# Patient Record
Sex: FEMALE | Race: WHITE | NOT HISPANIC OR LATINO | Employment: OTHER | URBAN - METROPOLITAN AREA
[De-identification: names, ages, dates, MRNs, and addresses within clinical notes are randomized per-mention and may not be internally consistent; named-entity substitution may affect disease eponyms.]

---

## 2015-05-13 LAB — CRC RECOMMENDATION EXT: NORMAL

## 2018-03-20 LAB
INFLUENZA A ANTIGEN, POC: NEGATIVE
INFLUENZA B ANTIGEN, POC: NEGATIVE
RAPID GROUP A STREP (OHS): NEGATIVE

## 2018-08-14 ENCOUNTER — HISTORICAL (OUTPATIENT)
Dept: LAB | Facility: HOSPITAL | Age: 63
End: 2018-08-14

## 2018-08-14 LAB
ABS NEUT (OLG): 6.4 X10(3)/MCL (ref 2.1–9.2)
ALBUMIN SERPL-MCNC: 4.2 GM/DL (ref 3.4–5)
ALBUMIN/GLOB SERPL: 1.2 RATIO (ref 1.1–2)
ALP SERPL-CCNC: 61 UNIT/L (ref 38–126)
ALT SERPL-CCNC: 26 UNIT/L (ref 12–78)
APPEARANCE, UA: CLEAR
AST SERPL-CCNC: 17 UNIT/L (ref 15–37)
BACTERIA SPEC CULT: NORMAL /HPF
BASOPHILS # BLD AUTO: 0.1 X10(3)/MCL (ref 0–0.2)
BASOPHILS NFR BLD AUTO: 1 %
BILIRUB SERPL-MCNC: 0.7 MG/DL (ref 0.2–1)
BILIRUB UR QL STRIP: NEGATIVE
BILIRUBIN DIRECT+TOT PNL SERPL-MCNC: 0.1 MG/DL (ref 0–0.5)
BILIRUBIN DIRECT+TOT PNL SERPL-MCNC: 0.6 MG/DL (ref 0–0.8)
BUN SERPL-MCNC: 11 MG/DL (ref 7–18)
CALCIUM SERPL-MCNC: 9 MG/DL (ref 8.5–10.1)
CHLORIDE SERPL-SCNC: 108 MMOL/L (ref 98–107)
CHOLEST SERPL-MCNC: 165 MG/DL (ref 0–200)
CHOLEST/HDLC SERPL: 3.8 {RATIO} (ref 0–4)
CO2 SERPL-SCNC: 23 MMOL/L (ref 21–32)
COLOR UR: YELLOW
CREAT SERPL-MCNC: 0.68 MG/DL (ref 0.55–1.02)
EOSINOPHIL # BLD AUTO: 0.2 X10(3)/MCL (ref 0–0.9)
EOSINOPHIL NFR BLD AUTO: 2 %
ERYTHROCYTE [DISTWIDTH] IN BLOOD BY AUTOMATED COUNT: 13.2 % (ref 11.5–17)
EST. AVERAGE GLUCOSE BLD GHB EST-MCNC: 108 MG/DL
GLOBULIN SER-MCNC: 3.4 GM/DL (ref 2.4–3.5)
GLUCOSE (UA): NEGATIVE
GLUCOSE SERPL-MCNC: 84 MG/DL (ref 74–106)
HBA1C MFR BLD: 5.4 % (ref 4.2–6.3)
HCT VFR BLD AUTO: 43.5 % (ref 37–47)
HDLC SERPL-MCNC: 44 MG/DL (ref 35–60)
HGB BLD-MCNC: 14.2 GM/DL (ref 12–16)
HGB UR QL STRIP: NEGATIVE
KETONES UR QL STRIP: NEGATIVE
LDLC SERPL CALC-MCNC: 86 MG/DL (ref 0–129)
LEUKOCYTE ESTERASE UR QL STRIP: NEGATIVE
LYMPHOCYTES # BLD AUTO: 2.8 X10(3)/MCL (ref 0.6–4.6)
LYMPHOCYTES NFR BLD AUTO: 28 %
MCH RBC QN AUTO: 30.1 PG (ref 27–31)
MCHC RBC AUTO-ENTMCNC: 32.6 GM/DL (ref 33–36)
MCV RBC AUTO: 92.4 FL (ref 80–94)
MONOCYTES # BLD AUTO: 0.6 X10(3)/MCL (ref 0.1–1.3)
MONOCYTES NFR BLD AUTO: 6 %
NEUTROPHILS # BLD AUTO: 6.4 X10(3)/MCL (ref 1.4–7.9)
NEUTROPHILS NFR BLD AUTO: 63 %
NITRITE UR QL STRIP: NEGATIVE
PH UR STRIP: 6 [PH] (ref 5–9)
PLATELET # BLD AUTO: 228 X10(3)/MCL (ref 130–400)
PMV BLD AUTO: 10.5 FL (ref 9.4–12.4)
POTASSIUM SERPL-SCNC: 4.6 MMOL/L (ref 3.5–5.1)
PROT SERPL-MCNC: 7.6 GM/DL (ref 6.4–8.2)
PROT UR QL STRIP: NEGATIVE
RBC # BLD AUTO: 4.71 X10(6)/MCL (ref 4.2–5.4)
RBC #/AREA URNS HPF: NORMAL /[HPF]
SODIUM SERPL-SCNC: 139 MMOL/L (ref 136–145)
SP GR UR STRIP: 1 (ref 1–1.03)
SQUAMOUS EPITHELIAL, UA: NORMAL
TRIGL SERPL-MCNC: 174 MG/DL (ref 30–150)
UA WBC MAN: NORMAL
UROBILINOGEN UR STRIP-ACNC: 0.2
VLDLC SERPL CALC-MCNC: 35 MG/DL
WBC # SPEC AUTO: 10.2 X10(3)/MCL (ref 4.5–11.5)

## 2019-02-14 LAB
CRC RECOMMENDATION EXT: NORMAL
FLEX SIG FOLLOW UP EXTERNAL: NORMAL

## 2019-02-18 ENCOUNTER — HISTORICAL (OUTPATIENT)
Dept: LAB | Facility: HOSPITAL | Age: 64
End: 2019-02-18

## 2019-02-18 LAB
ABS NEUT (OLG): 5.14 X10(3)/MCL (ref 2.1–9.2)
ALBUMIN SERPL-MCNC: 4.6 GM/DL (ref 3.4–5)
ALBUMIN/GLOB SERPL: 1.4 RATIO (ref 1.1–2)
ALP SERPL-CCNC: 60 UNIT/L (ref 38–126)
ALT SERPL-CCNC: 26 UNIT/L (ref 12–78)
APPEARANCE, UA: ABNORMAL
AST SERPL-CCNC: 11 UNIT/L (ref 15–37)
BACTERIA SPEC CULT: ABNORMAL /HPF
BASOPHILS # BLD AUTO: 0.1 X10(3)/MCL (ref 0–0.2)
BASOPHILS NFR BLD AUTO: 1 %
BILIRUB SERPL-MCNC: 0.5 MG/DL (ref 0.2–1)
BILIRUB UR QL STRIP: NEGATIVE
BILIRUBIN DIRECT+TOT PNL SERPL-MCNC: 0.1 MG/DL (ref 0–0.5)
BILIRUBIN DIRECT+TOT PNL SERPL-MCNC: 0.4 MG/DL (ref 0–0.8)
BUN SERPL-MCNC: 11 MG/DL (ref 7–18)
CALCIUM SERPL-MCNC: 9.9 MG/DL (ref 8.5–10.1)
CHLORIDE SERPL-SCNC: 109 MMOL/L (ref 98–107)
CO2 SERPL-SCNC: 25 MMOL/L (ref 21–32)
COLOR UR: YELLOW
CREAT SERPL-MCNC: 0.82 MG/DL (ref 0.55–1.02)
EOSINOPHIL # BLD AUTO: 0.3 X10(3)/MCL (ref 0–0.9)
EOSINOPHIL NFR BLD AUTO: 4 %
ERYTHROCYTE [DISTWIDTH] IN BLOOD BY AUTOMATED COUNT: 12.5 % (ref 11.5–17)
GLOBULIN SER-MCNC: 3.4 GM/DL (ref 2.4–3.5)
GLUCOSE (UA): NEGATIVE
GLUCOSE SERPL-MCNC: 97 MG/DL (ref 74–106)
HCT VFR BLD AUTO: 45.2 % (ref 37–47)
HGB BLD-MCNC: 14.7 GM/DL (ref 12–16)
HGB UR QL STRIP: NEGATIVE
KETONES UR QL STRIP: NEGATIVE
LEUKOCYTE ESTERASE UR QL STRIP: NEGATIVE
LYMPHOCYTES # BLD AUTO: 2.6 X10(3)/MCL (ref 0.6–4.6)
LYMPHOCYTES NFR BLD AUTO: 29 %
MCH RBC QN AUTO: 29.9 PG (ref 27–31)
MCHC RBC AUTO-ENTMCNC: 32.5 GM/DL (ref 33–36)
MCV RBC AUTO: 92.1 FL (ref 80–94)
MONOCYTES # BLD AUTO: 0.6 X10(3)/MCL (ref 0.1–1.3)
MONOCYTES NFR BLD AUTO: 7 %
NEUTROPHILS # BLD AUTO: 5.14 X10(3)/MCL (ref 2.1–9.2)
NEUTROPHILS NFR BLD AUTO: 59 %
NITRITE UR QL STRIP: NEGATIVE
PH UR STRIP: 5 [PH] (ref 5–9)
PLATELET # BLD AUTO: 259 X10(3)/MCL (ref 130–400)
PMV BLD AUTO: 10.4 FL (ref 9.4–12.4)
POTASSIUM SERPL-SCNC: 4.8 MMOL/L (ref 3.5–5.1)
PROT SERPL-MCNC: 8 GM/DL (ref 6.4–8.2)
PROT UR QL STRIP: NEGATIVE
RBC # BLD AUTO: 4.91 X10(6)/MCL (ref 4.2–5.4)
RBC #/AREA URNS HPF: ABNORMAL /[HPF]
SODIUM SERPL-SCNC: 140 MMOL/L (ref 136–145)
SP GR UR STRIP: 1.02 (ref 1–1.03)
SQUAMOUS EPITHELIAL, UA: 6 /HPF (ref 0–4)
TSH SERPL-ACNC: 10.3 MIU/L (ref 0.36–3.74)
UROBILINOGEN UR STRIP-ACNC: 0.2
WBC # SPEC AUTO: 8.7 X10(3)/MCL (ref 4.5–11.5)
WBC #/AREA URNS HPF: 6 /HPF (ref 0–3)

## 2019-02-21 LAB — FINAL CULTURE: NO GROWTH

## 2019-02-26 ENCOUNTER — HISTORICAL (OUTPATIENT)
Dept: LAB | Facility: HOSPITAL | Age: 64
End: 2019-02-26

## 2019-02-26 LAB
T4 FREE SERPL-MCNC: 0.86 NG/DL (ref 0.76–1.46)
TSH SERPL-ACNC: 6.43 MIU/L (ref 0.36–3.74)

## 2019-03-19 ENCOUNTER — HISTORICAL (OUTPATIENT)
Dept: LAB | Facility: HOSPITAL | Age: 64
End: 2019-03-19

## 2019-03-19 LAB — TSH SERPL-ACNC: 6.45 MIU/L (ref 0.36–3.74)

## 2019-04-24 ENCOUNTER — HISTORICAL (OUTPATIENT)
Dept: LAB | Facility: HOSPITAL | Age: 64
End: 2019-04-24

## 2019-04-24 LAB — TSH SERPL-ACNC: 2.88 MIU/L (ref 0.36–3.74)

## 2019-09-10 ENCOUNTER — HISTORICAL (OUTPATIENT)
Dept: LAB | Facility: HOSPITAL | Age: 64
End: 2019-09-10

## 2019-09-10 LAB
ABS NEUT (OLG): 4.67 X10(3)/MCL (ref 2.1–9.2)
ALBUMIN SERPL-MCNC: 4.3 GM/DL (ref 3.4–5)
ALBUMIN/GLOB SERPL: 1.3 RATIO (ref 1.1–2)
ALP SERPL-CCNC: 56 UNIT/L (ref 38–126)
ALT SERPL-CCNC: 26 UNIT/L (ref 12–78)
AST SERPL-CCNC: 12 UNIT/L (ref 15–37)
BASOPHILS # BLD AUTO: 0.1 X10(3)/MCL (ref 0–0.2)
BASOPHILS NFR BLD AUTO: 1 %
BILIRUB SERPL-MCNC: 0.6 MG/DL (ref 0.2–1)
BILIRUBIN DIRECT+TOT PNL SERPL-MCNC: 0.1 MG/DL (ref 0–0.5)
BILIRUBIN DIRECT+TOT PNL SERPL-MCNC: 0.5 MG/DL (ref 0–0.8)
BUN SERPL-MCNC: 9 MG/DL (ref 7–18)
CALCIUM SERPL-MCNC: 9.8 MG/DL (ref 8.5–10.1)
CHLORIDE SERPL-SCNC: 108 MMOL/L (ref 98–107)
CHOLEST SERPL-MCNC: 198 MG/DL (ref 0–200)
CHOLEST/HDLC SERPL: 4.4 {RATIO} (ref 0–4)
CO2 SERPL-SCNC: 26 MMOL/L (ref 21–32)
CREAT SERPL-MCNC: 0.88 MG/DL (ref 0.55–1.02)
EOSINOPHIL # BLD AUTO: 0.3 X10(3)/MCL (ref 0–0.9)
EOSINOPHIL NFR BLD AUTO: 4 %
ERYTHROCYTE [DISTWIDTH] IN BLOOD BY AUTOMATED COUNT: 12.7 % (ref 11.5–17)
EST. AVERAGE GLUCOSE BLD GHB EST-MCNC: 111 MG/DL
GLOBULIN SER-MCNC: 3.3 GM/DL (ref 2.4–3.5)
GLUCOSE SERPL-MCNC: 99 MG/DL (ref 74–106)
HBA1C MFR BLD: 5.5 % (ref 4.2–6.3)
HCT VFR BLD AUTO: 44.3 % (ref 37–47)
HDLC SERPL-MCNC: 45 MG/DL (ref 35–60)
HGB BLD-MCNC: 13.8 GM/DL (ref 12–16)
LDLC SERPL CALC-MCNC: 104 MG/DL (ref 0–129)
LYMPHOCYTES # BLD AUTO: 2.2 X10(3)/MCL (ref 0.6–4.6)
LYMPHOCYTES NFR BLD AUTO: 28 %
MCH RBC QN AUTO: 29.4 PG (ref 27–31)
MCHC RBC AUTO-ENTMCNC: 31.2 GM/DL (ref 33–36)
MCV RBC AUTO: 94.5 FL (ref 80–94)
MONOCYTES # BLD AUTO: 0.5 X10(3)/MCL (ref 0.1–1.3)
MONOCYTES NFR BLD AUTO: 7 %
NEUTROPHILS # BLD AUTO: 4.67 X10(3)/MCL (ref 2.1–9.2)
NEUTROPHILS NFR BLD AUTO: 59 %
PLATELET # BLD AUTO: 259 X10(3)/MCL (ref 130–400)
PMV BLD AUTO: 10.1 FL (ref 9.4–12.4)
POTASSIUM SERPL-SCNC: 5.3 MMOL/L (ref 3.5–5.1)
PROT SERPL-MCNC: 7.6 GM/DL (ref 6.4–8.2)
RBC # BLD AUTO: 4.69 X10(6)/MCL (ref 4.2–5.4)
SODIUM SERPL-SCNC: 147 MMOL/L (ref 136–145)
TRIGL SERPL-MCNC: 243 MG/DL (ref 30–150)
TSH SERPL-ACNC: 2.09 MIU/L (ref 0.36–3.74)
VLDLC SERPL CALC-MCNC: 49 MG/DL
WBC # SPEC AUTO: 7.9 X10(3)/MCL (ref 4.5–11.5)

## 2019-10-02 ENCOUNTER — HISTORICAL (OUTPATIENT)
Dept: RADIOLOGY | Facility: HOSPITAL | Age: 64
End: 2019-10-02

## 2020-09-22 ENCOUNTER — HISTORICAL (OUTPATIENT)
Dept: ADMINISTRATIVE | Facility: HOSPITAL | Age: 65
End: 2020-09-22

## 2020-09-22 LAB
ABS NEUT (OLG): 4.42 X10(3)/MCL (ref 2.1–9.2)
ALBUMIN SERPL-MCNC: 4.2 GM/DL (ref 3.4–4.8)
ALBUMIN/GLOB SERPL: 1.4 RATIO (ref 1.1–2)
ALP SERPL-CCNC: 54 UNIT/L (ref 40–150)
ALT SERPL-CCNC: 26 UNIT/L (ref 0–55)
AST SERPL-CCNC: 21 UNIT/L (ref 5–34)
BASOPHILS # BLD AUTO: 0.1 X10(3)/MCL (ref 0–0.2)
BASOPHILS NFR BLD AUTO: 1 %
BILIRUB SERPL-MCNC: 0.6 MG/DL
BILIRUBIN DIRECT+TOT PNL SERPL-MCNC: 0.2 MG/DL (ref 0–0.5)
BILIRUBIN DIRECT+TOT PNL SERPL-MCNC: 0.4 MG/DL (ref 0–0.8)
BUN SERPL-MCNC: 7.3 MG/DL (ref 9.8–20.1)
CALCIUM SERPL-MCNC: 9 MG/DL (ref 8.4–10.2)
CHLORIDE SERPL-SCNC: 107 MMOL/L (ref 98–107)
CHOLEST SERPL-MCNC: 190 MG/DL
CHOLEST/HDLC SERPL: 5 {RATIO} (ref 0–5)
CO2 SERPL-SCNC: 27 MMOL/L (ref 23–31)
CREAT SERPL-MCNC: 0.79 MG/DL (ref 0.55–1.02)
EOSINOPHIL # BLD AUTO: 0.3 X10(3)/MCL (ref 0–0.9)
EOSINOPHIL NFR BLD AUTO: 4 %
ERYTHROCYTE [DISTWIDTH] IN BLOOD BY AUTOMATED COUNT: 12.9 % (ref 11.5–17)
EST. AVERAGE GLUCOSE BLD GHB EST-MCNC: 105.4 MG/DL
GLOBULIN SER-MCNC: 2.9 GM/DL (ref 2.4–3.5)
GLUCOSE SERPL-MCNC: 90 MG/DL (ref 82–115)
HBA1C MFR BLD: 5.3 %
HCT VFR BLD AUTO: 41.1 % (ref 37–47)
HDLC SERPL-MCNC: 42 MG/DL (ref 35–60)
HGB BLD-MCNC: 13.2 GM/DL (ref 12–16)
LDLC SERPL CALC-MCNC: 98 MG/DL (ref 50–140)
LYMPHOCYTES # BLD AUTO: 1.8 X10(3)/MCL (ref 0.6–4.6)
LYMPHOCYTES NFR BLD AUTO: 25 %
MCH RBC QN AUTO: 29.9 PG (ref 27–31)
MCHC RBC AUTO-ENTMCNC: 32.1 GM/DL (ref 33–36)
MCV RBC AUTO: 93.2 FL (ref 80–94)
MONOCYTES # BLD AUTO: 0.5 X10(3)/MCL (ref 0.1–1.3)
MONOCYTES NFR BLD AUTO: 7 %
NEUTROPHILS # BLD AUTO: 4.42 X10(3)/MCL (ref 2.1–9.2)
NEUTROPHILS NFR BLD AUTO: 61 %
PLATELET # BLD AUTO: 221 X10(3)/MCL (ref 130–400)
PMV BLD AUTO: 10.5 FL (ref 9.4–12.4)
POTASSIUM SERPL-SCNC: 4.8 MMOL/L (ref 3.5–5.1)
PROT SERPL-MCNC: 7.1 GM/DL (ref 5.8–7.6)
RBC # BLD AUTO: 4.41 X10(6)/MCL (ref 4.2–5.4)
SODIUM SERPL-SCNC: 140 MMOL/L (ref 136–145)
TRIGL SERPL-MCNC: 249 MG/DL (ref 37–140)
TSH SERPL-ACNC: 2.13 UIU/ML (ref 0.35–4.94)
VLDLC SERPL CALC-MCNC: 50 MG/DL
WBC # SPEC AUTO: 7.2 X10(3)/MCL (ref 4.5–11.5)

## 2021-09-28 ENCOUNTER — HISTORICAL (OUTPATIENT)
Dept: ADMINISTRATIVE | Facility: HOSPITAL | Age: 66
End: 2021-09-28

## 2021-09-28 LAB
ABS NEUT (OLG): 4.43 X10(3)/MCL (ref 2.1–9.2)
ALBUMIN SERPL-MCNC: 4.3 GM/DL (ref 3.4–4.8)
ALBUMIN/GLOB SERPL: 1.5 RATIO (ref 1.1–2)
ALP SERPL-CCNC: 59 UNIT/L (ref 40–150)
ALT SERPL-CCNC: 23 UNIT/L (ref 0–55)
AST SERPL-CCNC: 18 UNIT/L (ref 5–34)
BASOPHILS # BLD AUTO: 0.1 X10(3)/MCL (ref 0–0.2)
BASOPHILS NFR BLD AUTO: 1 %
BILIRUB SERPL-MCNC: 0.5 MG/DL
BILIRUBIN DIRECT+TOT PNL SERPL-MCNC: 0.2 MG/DL (ref 0–0.5)
BILIRUBIN DIRECT+TOT PNL SERPL-MCNC: 0.3 MG/DL (ref 0–0.8)
BUN SERPL-MCNC: 9.4 MG/DL (ref 9.8–20.1)
CALCIUM SERPL-MCNC: 10.1 MG/DL (ref 8.4–10.2)
CHLORIDE SERPL-SCNC: 110 MMOL/L (ref 98–107)
CHOLEST SERPL-MCNC: 170 MG/DL
CHOLEST/HDLC SERPL: 5 {RATIO} (ref 0–5)
CO2 SERPL-SCNC: 25 MMOL/L (ref 23–31)
CREAT SERPL-MCNC: 0.81 MG/DL (ref 0.55–1.02)
EOSINOPHIL # BLD AUTO: 0.2 X10(3)/MCL (ref 0–0.9)
EOSINOPHIL NFR BLD AUTO: 3 %
ERYTHROCYTE [DISTWIDTH] IN BLOOD BY AUTOMATED COUNT: 13.5 % (ref 11.5–17)
EST. AVERAGE GLUCOSE BLD GHB EST-MCNC: 99.7 MG/DL
GLOBULIN SER-MCNC: 2.9 GM/DL (ref 2.4–3.5)
GLUCOSE SERPL-MCNC: 91 MG/DL (ref 82–115)
HBA1C MFR BLD: 5.1 %
HCT VFR BLD AUTO: 40 % (ref 37–47)
HDLC SERPL-MCNC: 37 MG/DL (ref 35–60)
HGB BLD-MCNC: 12.6 GM/DL (ref 12–16)
LDLC SERPL CALC-MCNC: 72 MG/DL (ref 50–140)
LYMPHOCYTES # BLD AUTO: 1.9 X10(3)/MCL (ref 0.6–4.6)
LYMPHOCYTES NFR BLD AUTO: 26 %
MCH RBC QN AUTO: 29.5 PG (ref 27–31)
MCHC RBC AUTO-ENTMCNC: 31.5 GM/DL (ref 33–36)
MCV RBC AUTO: 93.7 FL (ref 80–94)
MONOCYTES # BLD AUTO: 0.5 X10(3)/MCL (ref 0.1–1.3)
MONOCYTES NFR BLD AUTO: 7 %
NEUTROPHILS # BLD AUTO: 4.43 X10(3)/MCL (ref 2.1–9.2)
NEUTROPHILS NFR BLD AUTO: 62 %
PLATELET # BLD AUTO: 268 X10(3)/MCL (ref 130–400)
PMV BLD AUTO: 10.7 FL (ref 9.4–12.4)
POTASSIUM SERPL-SCNC: 5 MMOL/L (ref 3.5–5.1)
PROT SERPL-MCNC: 7.2 GM/DL (ref 5.8–7.6)
RBC # BLD AUTO: 4.27 X10(6)/MCL (ref 4.2–5.4)
SODIUM SERPL-SCNC: 145 MMOL/L (ref 136–145)
TRIGL SERPL-MCNC: 307 MG/DL (ref 37–140)
TSH SERPL-ACNC: 2.34 UIU/ML (ref 0.35–4.94)
VLDLC SERPL CALC-MCNC: 61 MG/DL
WBC # SPEC AUTO: 7.2 X10(3)/MCL (ref 4.5–11.5)

## 2022-05-03 NOTE — HISTORICAL [facility]ER
This is a historical note converted from [employer]. Formatting and pictures may have been removed.  Please reference [employer] for original formatting and attached multimedia. Spoke to patient over the phone, she discontinued all of her blood pressure medications and?blood pressure readings at home?are normal?continuously. ?Therefore,?she will be taken off of her blood pressure medication?and?her visit will be changed from 6 months for reevaluation to 1 year for wellness

## 2022-07-31 ENCOUNTER — OFFICE VISIT (OUTPATIENT)
Dept: URGENT CARE | Facility: CLINIC | Age: 67
End: 2022-07-31
Payer: MEDICARE

## 2022-07-31 VITALS
BODY MASS INDEX: 25.41 KG/M2 | HEART RATE: 73 BPM | RESPIRATION RATE: 18 BRPM | TEMPERATURE: 98 F | DIASTOLIC BLOOD PRESSURE: 83 MMHG | HEIGHT: 63 IN | WEIGHT: 143.38 LBS | SYSTOLIC BLOOD PRESSURE: 189 MMHG | OXYGEN SATURATION: 98 %

## 2022-07-31 DIAGNOSIS — H60.331 ACUTE SWIMMER'S EAR OF RIGHT SIDE: ICD-10-CM

## 2022-07-31 PROBLEM — F41.1 GENERALIZED ANXIETY DISORDER: Status: ACTIVE | Noted: 2022-07-31

## 2022-07-31 PROBLEM — C18.9 MALIGNANT NEOPLASM OF COLON: Status: ACTIVE | Noted: 2022-07-31

## 2022-07-31 PROBLEM — E78.1 HYPERTRIGLYCERIDEMIA: Status: ACTIVE | Noted: 2022-07-31

## 2022-07-31 PROBLEM — E03.9 HYPOTHYROIDISM: Status: ACTIVE | Noted: 2022-07-31

## 2022-07-31 PROBLEM — Z85.42 HISTORY OF ENDOMETRIAL CANCER: Status: ACTIVE | Noted: 2022-07-31

## 2022-07-31 PROBLEM — C44.90 MALIGNANT NEOPLASM OF SKIN: Status: ACTIVE | Noted: 2022-07-31

## 2022-07-31 PROCEDURE — 99213 PR OFFICE/OUTPT VISIT, EST, LEVL III, 20-29 MIN: ICD-10-PCS | Mod: ,,, | Performed by: GENERAL PRACTICE

## 2022-07-31 PROCEDURE — 99213 OFFICE O/P EST LOW 20 MIN: CPT | Mod: ,,, | Performed by: GENERAL PRACTICE

## 2022-07-31 RX ORDER — LORAZEPAM 0.5 MG/1
TABLET ORAL
COMMUNITY
Start: 2021-11-17 | End: 2022-10-03 | Stop reason: SDUPTHER

## 2022-07-31 RX ORDER — CIPROFLOXACIN AND DEXAMETHASONE 3; 1 MG/ML; MG/ML
4 SUSPENSION/ DROPS AURICULAR (OTIC) 2 TIMES DAILY
Qty: 7.5 ML | Refills: 1 | Status: SHIPPED | OUTPATIENT
Start: 2022-07-31 | End: 2022-07-31

## 2022-07-31 RX ORDER — LEVOTHYROXINE SODIUM 50 UG/1
50 TABLET ORAL DAILY
COMMUNITY
Start: 2022-07-04 | End: 2022-10-03 | Stop reason: SDUPTHER

## 2022-07-31 RX ORDER — NEOMYCIN SULFATE, POLYMYXIN B SULFATE AND HYDROCORTISONE 10; 3.5; 1 MG/ML; MG/ML; [USP'U]/ML
3 SUSPENSION/ DROPS AURICULAR (OTIC) 4 TIMES DAILY
Qty: 10 ML | Refills: 2 | Status: SHIPPED | OUTPATIENT
Start: 2022-07-31 | End: 2022-08-07

## 2022-07-31 NOTE — PROGRESS NOTES
"Subjective:       Patient ID: Cesia Lawson is a 66 y.o. female.    Vitals:  height is 5' 3" (1.6 m) and weight is 65 kg (143 lb 6.4 oz). Her oral temperature is 98.3 °F (36.8 °C). Her blood pressure is 189/83 (abnormal) and her pulse is 73. Her respiration is 18 and oxygen saturation is 98%.     Chief Complaint: Ear Fullness (Francisco J ear fullness, itching x yesterday) and Otalgia (Right ear pain x yesterday)    66 y.o. female presents to clinic c/o francisco j ear fullness, itching, right ear pain x yesterday. Pt has taken ibuprofen for symptoms.     Ear Fullness   There is pain in both ears. This is a new problem. The current episode started yesterday. The problem occurs constantly. The problem has been unchanged. There has been no fever. The pain is mild. Pertinent negatives include no coughing or sore throat. She has tried NSAIDs for the symptoms. The treatment provided mild relief.   Otalgia   There is pain in the right ear. This is a new problem. The current episode started yesterday. The problem occurs constantly. The problem has been unchanged. There has been no fever. The pain is mild. Pertinent negatives include no coughing or sore throat. She has tried NSAIDs for the symptoms. The treatment provided mild relief.       Constitution: Negative for fever and generalized weakness.   HENT: Positive for ear pain. Negative for sore throat.    Cardiovascular: Negative.    Respiratory: Negative for cough.    Gastrointestinal: Negative.        Objective:      Physical Exam   HENT:   Ears:   Right Ear: Tympanic membrane normal.   Left Ear: Tympanic membrane normal.   Mouth/Throat: No oropharyngeal exudate or posterior oropharyngeal erythema.   Eyes: Conjunctivae are normal.   Neck: Neck supple.   Cardiovascular: Normal rate and regular rhythm.   Pulmonary/Chest: Effort normal and breath sounds normal.   Abdominal: Normal appearance.   Skin: Skin is warm, dry and no rash.         Assessment:       1. Acute swimmer's ear of " right side          Plan:         Acute swimmer's ear of right side  -     Discontinue: ciprofloxacin-dexamethasone 0.3-0.1% (CIPRODEX) 0.3-0.1 % DrpS; Place 4 drops into both ears 2 (two) times daily.  Dispense: 7.5 mL; Refill: 1  -     neomycin-polymyxin-hydrocortisone (CORTISPORIN) 3.5-10,000-1 mg/mL-unit/mL-% otic suspension; Place 3 drops into the right ear 4 (four) times daily. for 7 days  Dispense: 10 mL; Refill: 2    Start ear drops today, use as directed.  Ibuprofen for pain as needed.  Do not submerge head under water for several days, or until symptoms improve.  Seek medical re-evaluation if symptoms do not improve with this treatment plan over the next several days.

## 2022-07-31 NOTE — ASSESSMENT & PLAN NOTE
Start ear drops today, use as directed.  Ibuprofen for pain as needed.  Do not submerge head under water for several days, or until symptoms improve.  Seek medical re-evaluation if symptoms do not improve with this treatment plan over the next several days.

## 2022-09-17 ENCOUNTER — HISTORICAL (OUTPATIENT)
Dept: ADMINISTRATIVE | Facility: HOSPITAL | Age: 67
End: 2022-09-17
Payer: COMMERCIAL

## 2022-09-19 ENCOUNTER — PATIENT MESSAGE (OUTPATIENT)
Dept: PRIMARY CARE CLINIC | Facility: CLINIC | Age: 67
End: 2022-09-19
Payer: COMMERCIAL

## 2022-10-03 ENCOUNTER — PATIENT MESSAGE (OUTPATIENT)
Dept: PRIMARY CARE CLINIC | Facility: CLINIC | Age: 67
End: 2022-10-03
Payer: COMMERCIAL

## 2022-10-03 RX ORDER — LORAZEPAM 0.5 MG/1
0.5 TABLET ORAL EVERY 8 HOURS PRN
Qty: 90 TABLET | Refills: 1 | Status: SHIPPED | OUTPATIENT
Start: 2022-10-03 | End: 2023-07-19 | Stop reason: SDUPTHER

## 2022-10-03 RX ORDER — LEVOTHYROXINE SODIUM 50 UG/1
50 TABLET ORAL DAILY
Qty: 90 TABLET | Refills: 3 | Status: SHIPPED | OUTPATIENT
Start: 2022-10-03 | End: 2023-10-18 | Stop reason: SDUPTHER

## 2022-12-01 DIAGNOSIS — Z00.00 ENCOUNTER FOR WELLNESS EXAMINATION: Primary | ICD-10-CM

## 2022-12-01 DIAGNOSIS — Z13.220 ENCOUNTER FOR LIPID SCREENING FOR CARDIOVASCULAR DISEASE: ICD-10-CM

## 2022-12-01 DIAGNOSIS — Z13.6 ENCOUNTER FOR LIPID SCREENING FOR CARDIOVASCULAR DISEASE: ICD-10-CM

## 2022-12-02 ENCOUNTER — CLINICAL SUPPORT (OUTPATIENT)
Dept: PRIMARY CARE CLINIC | Facility: CLINIC | Age: 67
End: 2022-12-02
Payer: MEDICARE

## 2022-12-02 DIAGNOSIS — Z13.220 ENCOUNTER FOR LIPID SCREENING FOR CARDIOVASCULAR DISEASE: ICD-10-CM

## 2022-12-02 DIAGNOSIS — Z13.6 ENCOUNTER FOR LIPID SCREENING FOR CARDIOVASCULAR DISEASE: ICD-10-CM

## 2022-12-02 DIAGNOSIS — Z00.00 ENCOUNTER FOR WELLNESS EXAMINATION: ICD-10-CM

## 2022-12-02 LAB
ALBUMIN SERPL-MCNC: 4.2 GM/DL (ref 3.4–4.8)
ALBUMIN/GLOB SERPL: 1.6 RATIO (ref 1.1–2)
ALP SERPL-CCNC: 54 UNIT/L (ref 40–150)
ALT SERPL-CCNC: 14 UNIT/L (ref 0–55)
AST SERPL-CCNC: 14 UNIT/L (ref 5–34)
BASOPHILS # BLD AUTO: 0.09 X10(3)/MCL (ref 0–0.2)
BASOPHILS NFR BLD AUTO: 1.3 %
BILIRUBIN DIRECT+TOT PNL SERPL-MCNC: 0.7 MG/DL
BUN SERPL-MCNC: 11.7 MG/DL (ref 9.8–20.1)
CALCIUM SERPL-MCNC: 9.5 MG/DL (ref 8.4–10.2)
CHLORIDE SERPL-SCNC: 107 MMOL/L (ref 98–107)
CHOLEST SERPL-MCNC: 201 MG/DL
CHOLEST/HDLC SERPL: 5 {RATIO} (ref 0–5)
CO2 SERPL-SCNC: 24 MMOL/L (ref 23–31)
CREAT SERPL-MCNC: 0.83 MG/DL (ref 0.55–1.02)
EOSINOPHIL # BLD AUTO: 0.18 X10(3)/MCL (ref 0–0.9)
EOSINOPHIL NFR BLD AUTO: 2.7 %
ERYTHROCYTE [DISTWIDTH] IN BLOOD BY AUTOMATED COUNT: 12.5 % (ref 11.5–17)
EST. AVERAGE GLUCOSE BLD GHB EST-MCNC: 102.5 MG/DL
GFR SERPLBLD CREATININE-BSD FMLA CKD-EPI: >60 MLS/MIN/1.73/M2
GLOBULIN SER-MCNC: 2.7 GM/DL (ref 2.4–3.5)
GLUCOSE SERPL-MCNC: 91 MG/DL (ref 82–115)
HBA1C MFR BLD: 5.2 %
HCT VFR BLD AUTO: 38.9 % (ref 37–47)
HDLC SERPL-MCNC: 43 MG/DL (ref 35–60)
HGB BLD-MCNC: 12.9 GM/DL (ref 12–16)
IMM GRANULOCYTES # BLD AUTO: 0.02 X10(3)/MCL (ref 0–0.04)
IMM GRANULOCYTES NFR BLD AUTO: 0.3 %
LDLC SERPL CALC-MCNC: 128 MG/DL (ref 50–140)
LYMPHOCYTES # BLD AUTO: 2.06 X10(3)/MCL (ref 0.6–4.6)
LYMPHOCYTES NFR BLD AUTO: 30.4 %
MCH RBC QN AUTO: 29.5 PG (ref 27–31)
MCHC RBC AUTO-ENTMCNC: 33.2 MG/DL (ref 33–36)
MCV RBC AUTO: 88.8 FL (ref 80–94)
MONOCYTES # BLD AUTO: 0.49 X10(3)/MCL (ref 0.1–1.3)
MONOCYTES NFR BLD AUTO: 7.2 %
NEUTROPHILS # BLD AUTO: 3.9 X10(3)/MCL (ref 2.1–9.2)
NEUTROPHILS NFR BLD AUTO: 58.1 %
NRBC BLD AUTO-RTO: 0 %
PLATELET # BLD AUTO: 240 X10(3)/MCL (ref 130–400)
PMV BLD AUTO: 10.1 FL (ref 7.4–10.4)
POTASSIUM SERPL-SCNC: 4.1 MMOL/L (ref 3.5–5.1)
PROT SERPL-MCNC: 6.9 GM/DL (ref 5.8–7.6)
RBC # BLD AUTO: 4.38 X10(6)/MCL (ref 4.2–5.4)
SODIUM SERPL-SCNC: 139 MMOL/L (ref 136–145)
TRIGL SERPL-MCNC: 151 MG/DL (ref 37–140)
TSH SERPL-ACNC: 2.59 UIU/ML (ref 0.35–4.94)
VLDLC SERPL CALC-MCNC: 30 MG/DL
WBC # SPEC AUTO: 6.8 X10(3)/MCL (ref 4.5–11.5)

## 2022-12-02 PROCEDURE — 36415 COLL VENOUS BLD VENIPUNCTURE: CPT

## 2022-12-05 PROBLEM — E03.9 ACQUIRED HYPOTHYROIDISM: Chronic | Status: ACTIVE | Noted: 2022-07-31

## 2022-12-05 PROBLEM — E78.1 HYPERTRIGLYCERIDEMIA: Chronic | Status: ACTIVE | Noted: 2022-07-31

## 2022-12-05 PROBLEM — F41.1 GENERALIZED ANXIETY DISORDER: Chronic | Status: ACTIVE | Noted: 2022-07-31

## 2022-12-05 NOTE — ASSESSMENT & PLAN NOTE
This medical condition is currently stable on prescription medication, continue current treatment plan.  Patient does take lorazepam for anxiety but only occasionally.

## 2022-12-05 NOTE — PROGRESS NOTES
Patient ID: [id number]     Chief Complaint: Annual Exam (Left shoulder blade pain sending numbness and tingling down arm to thumb)      HPI:     Cesia Lawson is a 67 y.o. female here today for a Medicare Wellness.     A significant and separate E/M service was performed.  Patient is having some pain emanating from the left side of her neck, down towards her left shoulder blade, and down her left arm.  She has had this before, but it seems to be more significant, has been occurring for the past several days, she normally takes over-the-counter medications with less than optimal relief.  No skin rash, no peripheral numbness or weakness, only discomfort.  ----------------------------  Colon cancer  Hyperthyroidism  Hypothyroidism     Past Surgical History:   Procedure Laterality Date    COLON SURGERY      COLONOSCOPY      HYSTERECTOMY         Review of patient's allergies indicates:  No Known Allergies    No outpatient medications have been marked as taking for the 22 encounter (Office Visit) with Tavo Rivera MD.       Social History     Socioeconomic History    Marital status:    Tobacco Use    Smoking status: Former     Packs/day: 0.50     Types: Cigarettes     Start date: 1965     Quit date: 2018     Years since quittin.3    Smokeless tobacco: Never   Substance and Sexual Activity    Alcohol use: Not Currently        Family History   Problem Relation Age of Onset    Kidney cancer Mother     Lung cancer Father     Rectal cancer Sister     Rectal cancer Brother         Patient Care Team:  Tavo Rivera MD as PCP - General (Family Medicine)     Opioid Screening: Patient medication list reviewed, patient is not taking prescription opioids. Patient is not using additional opioids than prescribed. Patient is at low risk of substance abuse based on this opioid use history.       Subjective:     Review of Systems   Constitutional: Negative.  Negative for malaise/fatigue and weight loss.  "  HENT: Negative.     Eyes: Negative.    Respiratory: Negative.  Negative for shortness of breath.    Cardiovascular: Negative.  Negative for chest pain.   Gastrointestinal: Negative.    Genitourinary: Negative.    Musculoskeletal:         Left parascapular and left arm pain   Skin: Negative.    Neurological: Negative.  Negative for focal weakness, weakness and headaches.   Endo/Heme/Allergies: Negative.    Psychiatric/Behavioral: Negative.  Negative for depression and memory loss. The patient is not nervous/anxious.        Patient Reported Health Risk Assessment       Objective:     BP (!) 162/80   Pulse 69   Resp 18   Ht 5' 3" (1.6 m)   Wt 61.7 kg (136 lb)   SpO2 98%   BMI 24.09 kg/m²     Physical Exam  Constitutional:       Appearance: Normal appearance.   HENT:      Head: Normocephalic.      Mouth/Throat:      Pharynx: Oropharynx is clear.   Eyes:      Conjunctiva/sclera: Conjunctivae normal.      Pupils: Pupils are equal, round, and reactive to light.   Cardiovascular:      Rate and Rhythm: Normal rate and regular rhythm.      Heart sounds: Normal heart sounds.   Pulmonary:      Effort: Pulmonary effort is normal.      Breath sounds: Normal breath sounds.   Abdominal:      General: Abdomen is flat.      Palpations: Abdomen is soft.   Musculoskeletal:         General: Normal range of motion.      Cervical back: Neck supple.   Skin:     General: Skin is warm and dry.   Neurological:      General: No focal deficit present.      Mental Status: She is oriented to person, place, and time.   Psychiatric:         Mood and Affect: Mood normal.         Behavior: Behavior normal.         Thought Content: Thought content normal.         Judgment: Judgment normal.       Lab Results   Component Value Date     12/02/2022    K 4.1 12/02/2022    CO2 24 12/02/2022    BUN 11.7 12/02/2022    CREATININE 0.83 12/02/2022    CALCIUM 9.5 12/02/2022    ALBUMIN 4.2 12/02/2022    BILITOT 0.7 12/02/2022    ALKPHOS 54 12/02/2022 "    AST 14 12/02/2022    ALT 14 12/02/2022    EGFRNONAA >60 09/28/2021     Lab Results   Component Value Date    WBC 6.8 12/02/2022    RBC 4.38 12/02/2022    HGB 12.9 12/02/2022    HCT 38.9 12/02/2022    MCV 88.8 12/02/2022    RDW 12.5 12/02/2022     12/02/2022      Lab Results   Component Value Date    CHOL 201 (H) 12/02/2022    TRIG 151 (H) 12/02/2022    HDL 43 12/02/2022    .00 12/02/2022    TOTALCHOLEST 5 12/02/2022     Lab Results   Component Value Date    TSH 2.5866 12/02/2022     Lab Results   Component Value Date    HGBA1C 5.2 12/02/2022              No flowsheet data found.  Fall Risk Assessment - Outpatient 12/6/2022 7/31/2022   Mobility Status Ambulatory Ambulatory   Number of falls 0 0   Identified as fall risk 0 0              Assessment:       ICD-10-CM ICD-9-CM   1. Medicare annual wellness visit, subsequent  Z00.00 V70.0   2. Primary hypertension  I10 401.9   3. Generalized anxiety disorder  F41.1 300.02   4. Acquired hypothyroidism  E03.9 244.9   5. Hypertriglyceridemia  E78.1 272.1   6. Abnormal blood chemistry  R79.9 790.6   7. Screening mammogram for breast cancer  Z12.31 V76.12        Plan:     Medicare Annual Wellness and Personalized Prevention Plan:   Fall Risk + Home Safety + Hearing Impairment + Depression Screen + Cognitive Impairment Screen + Health Risk Assessment all reviewed.       Health Maintenance Topics with due status: Not Due       Topic Last Completion Date    Lipid Panel 12/02/2022      The patient's Health Maintenance was reviewed and the following appears to be due at this time:   Health Maintenance Due   Topic Date Due    Hepatitis C Screening  Never done    TETANUS VACCINE  Never done    Shingles Vaccine (1 of 2) Never done    DEXA Scan  Never done    Colorectal Cancer Screening  Never done    Pneumococcal Vaccines (Age 65+) (2 - PCV) 08/14/2019    Mammogram  10/02/2020    COVID-19 Vaccine (5 - Booster for Moderna series) 11/25/2022         1. Medicare annual  wellness visit, subsequent  Comments:  Overall health status was reviewed.  Good health habits reinforced.  Annual wellness exams recommended.  Orders:  -     TSH; Future; Expected date: 12/06/2023  -     Hemoglobin A1C; Future; Expected date: 12/06/2023  -     Lipid Panel; Future; Expected date: 12/06/2023  -     Comprehensive Metabolic Panel; Future; Expected date: 12/06/2023  -     CBC Auto Differential; Future; Expected date: 12/06/2023  -     Hepatitis C Antibody; Future; Expected date: 12/06/2023    2. Primary hypertension    3. Generalized anxiety disorder  Assessment & Plan:  This medical condition is currently stable on prescription medication, continue current treatment plan.        4. Acquired hypothyroidism  Assessment & Plan:  TSH/thyroid function appears to be normal, continue current dose of thyroid supplementation medication.    Orders:  -     TSH; Future; Expected date: 12/06/2023    5. Hypertriglyceridemia  Assessment & Plan:  Cholesterol/lipid blood testing shows adequate control, continue current treatment plan, including prescription medications if prescribed, and/or diet high in fiber, low in saturated fats as discussed during clinic visit.    Orders:  -     Lipid Panel; Future; Expected date: 12/06/2023    6. Abnormal blood chemistry  -     TSH; Future; Expected date: 12/06/2023  -     Hemoglobin A1C; Future; Expected date: 12/06/2023  -     Lipid Panel; Future; Expected date: 12/06/2023  -     Comprehensive Metabolic Panel; Future; Expected date: 12/06/2023  -     CBC Auto Differential; Future; Expected date: 12/06/2023  -     Hepatitis C Antibody; Future; Expected date: 12/06/2023    7. Screening mammogram for breast cancer  -     Mammo Digital Screening Bilat; Future; Expected date: 12/06/2022       Advance Care Planning   I attest to discussing Advance Care Planning with patient and/or family member.  Education was provided including the importance of the Health Care Power of ,  "Advance Directives, and/or [place] documentation.  The patient expressed understanding to the importance of this information and discussion.           Medication List with Changes/Refills   Current Medications    ADAPT BARRIER RING 2 " MISC    use as directed       Start Date: 11/22/2022End Date: --    LEVOTHYROXINE (SYNTHROID) 50 MCG TABLET    Take 1 tablet (50 mcg total) by mouth once daily.       Start Date: 10/3/2022 End Date: 10/3/2023    LORAZEPAM (ATIVAN) 0.5 MG TABLET    Take 1 tablet (0.5 mg total) by mouth every 8 (eight) hours as needed for Anxiety.       Start Date: 10/3/2022 End Date: 4/1/2023    MUPIROCIN (BACTROBAN) 2 % OINTMENT    Apply topically 3 (three) times daily.       Start Date: 7/13/2022 End Date: --    NEW IMAGE DRAINABLE POUCH 2 3/4 " MISC    use as directed       Start Date: 11/22/2022End Date: --    NEW IMAGE FLEXWEAR FLAT 2 3/4 " MISC    use as directed. CHANGE every 3 DAYS       Start Date: 11/22/2022End Date: --    TRIAMCINOLONE (KENALOG) 0.5 % OINTMENT    Apply topically 3 (three) times daily.       Start Date: 7/14/2022 End Date: --        No follow-ups on file. In addition to their scheduled follow up, the patient has also been instructed to follow up on as needed basis.       "

## 2022-12-05 NOTE — ASSESSMENT & PLAN NOTE
TSH/thyroid function appears to be normal, continue current dose of thyroid supplementation medication.

## 2022-12-06 ENCOUNTER — OFFICE VISIT (OUTPATIENT)
Dept: PRIMARY CARE CLINIC | Facility: CLINIC | Age: 67
End: 2022-12-06
Payer: MEDICARE

## 2022-12-06 VITALS
HEIGHT: 63 IN | OXYGEN SATURATION: 98 % | DIASTOLIC BLOOD PRESSURE: 80 MMHG | SYSTOLIC BLOOD PRESSURE: 162 MMHG | HEART RATE: 69 BPM | RESPIRATION RATE: 18 BRPM | WEIGHT: 136 LBS | BODY MASS INDEX: 24.1 KG/M2

## 2022-12-06 DIAGNOSIS — I10 PRIMARY HYPERTENSION: ICD-10-CM

## 2022-12-06 DIAGNOSIS — Z00.00 MEDICARE ANNUAL WELLNESS VISIT, SUBSEQUENT: Primary | ICD-10-CM

## 2022-12-06 DIAGNOSIS — F41.1 GENERALIZED ANXIETY DISORDER: Chronic | ICD-10-CM

## 2022-12-06 DIAGNOSIS — E03.9 ACQUIRED HYPOTHYROIDISM: Chronic | ICD-10-CM

## 2022-12-06 DIAGNOSIS — Z43.2 ILEOSTOMY CARE: Primary | ICD-10-CM

## 2022-12-06 DIAGNOSIS — R79.9 ABNORMAL BLOOD CHEMISTRY: ICD-10-CM

## 2022-12-06 DIAGNOSIS — M54.12 CERVICAL RADICULOPATHY: Chronic | ICD-10-CM

## 2022-12-06 DIAGNOSIS — Z12.31 SCREENING MAMMOGRAM FOR BREAST CANCER: ICD-10-CM

## 2022-12-06 DIAGNOSIS — L08.9 SKIN INFECTION: Primary | ICD-10-CM

## 2022-12-06 DIAGNOSIS — E78.1 HYPERTRIGLYCERIDEMIA: Chronic | ICD-10-CM

## 2022-12-06 PROBLEM — H60.331 ACUTE SWIMMER'S EAR OF RIGHT SIDE: Status: RESOLVED | Noted: 2022-07-31 | Resolved: 2022-12-06

## 2022-12-06 PROCEDURE — 99213 OFFICE O/P EST LOW 20 MIN: CPT | Mod: 25,,, | Performed by: GENERAL PRACTICE

## 2022-12-06 PROCEDURE — G0439 PPPS, SUBSEQ VISIT: HCPCS | Mod: ,,, | Performed by: GENERAL PRACTICE

## 2022-12-06 PROCEDURE — G0439 PR MEDICARE ANNUAL WELLNESS SUBSEQUENT VISIT: ICD-10-PCS | Mod: ,,, | Performed by: GENERAL PRACTICE

## 2022-12-06 PROCEDURE — 96372 PR INJECTION,THERAP/PROPH/DIAG2ST, IM OR SUBCUT: ICD-10-PCS | Mod: ,,, | Performed by: GENERAL PRACTICE

## 2022-12-06 PROCEDURE — 99213 PR OFFICE/OUTPT VISIT, EST, LEVL III, 20-29 MIN: ICD-10-PCS | Mod: 25,,, | Performed by: GENERAL PRACTICE

## 2022-12-06 PROCEDURE — 96372 THER/PROPH/DIAG INJ SC/IM: CPT | Mod: ,,, | Performed by: GENERAL PRACTICE

## 2022-12-06 RX ORDER — BETAMETHASONE SODIUM PHOSPHATE AND BETAMETHASONE ACETATE 3; 3 MG/ML; MG/ML
9 INJECTION, SUSPENSION INTRA-ARTICULAR; INTRALESIONAL; INTRAMUSCULAR; SOFT TISSUE
Status: COMPLETED | OUTPATIENT
Start: 2022-12-06 | End: 2022-12-06

## 2022-12-06 RX ORDER — OSTOMY SUPPLY 4" X 4"
EACH MISCELLANEOUS
COMMUNITY
Start: 2022-11-22 | End: 2023-12-12 | Stop reason: SDUPTHER

## 2022-12-06 RX ORDER — MUPIROCIN 20 MG/G
OINTMENT TOPICAL 3 TIMES DAILY
COMMUNITY
Start: 2022-07-13

## 2022-12-06 RX ORDER — OSTOMY SUPPLY 1 3/4"
EACH MISCELLANEOUS
COMMUNITY
Start: 2022-11-22 | End: 2023-12-12 | Stop reason: SDUPTHER

## 2022-12-06 RX ORDER — TRIAMCINOLONE ACETONIDE 5 MG/G
OINTMENT TOPICAL 3 TIMES DAILY
Qty: 15 G | Refills: 5 | Status: SHIPPED | OUTPATIENT
Start: 2022-12-06 | End: 2022-12-08 | Stop reason: CLARIF

## 2022-12-06 RX ORDER — CELECOXIB 200 MG/1
200 CAPSULE ORAL 2 TIMES DAILY
Qty: 30 CAPSULE | Refills: 3 | Status: SHIPPED | OUTPATIENT
Start: 2022-12-06 | End: 2023-02-04

## 2022-12-06 RX ORDER — COLOSTOMY BAGS 1 3/4"
EACH MISCELLANEOUS
COMMUNITY
Start: 2022-11-22 | End: 2023-12-12 | Stop reason: SDUPTHER

## 2022-12-06 RX ORDER — TRIAMCINOLONE ACETONIDE 5 MG/G
OINTMENT TOPICAL 3 TIMES DAILY
COMMUNITY
Start: 2022-07-14 | End: 2022-12-06 | Stop reason: SDUPTHER

## 2022-12-06 RX ADMIN — BETAMETHASONE SODIUM PHOSPHATE AND BETAMETHASONE ACETATE 9 MG: 3; 3 INJECTION, SUSPENSION INTRA-ARTICULAR; INTRALESIONAL; INTRAMUSCULAR; SOFT TISSUE at 11:12

## 2022-12-06 NOTE — ASSESSMENT & PLAN NOTE
Modify activity as necessary, gentle stretching suggested.  Use either ice pack for pain relief or localized he to promote healing, use as needed.  Use medications either over-the-counter or prescription as prescribed/needed, avoid using sedating medications before working/driving as discussed.  Seek medical re-evaluation if not improved with this treatment plan over the next several weeks.    Celebrex prescribed, patient will initially try ibuprofen but take the Celebrex if her pain continues in any significant way.  Further evaluation and continue workup may be necessary if she continues to have problems.

## 2022-12-08 ENCOUNTER — PATIENT MESSAGE (OUTPATIENT)
Dept: PRIMARY CARE CLINIC | Facility: CLINIC | Age: 67
End: 2022-12-08
Payer: COMMERCIAL

## 2022-12-08 ENCOUNTER — TELEPHONE (OUTPATIENT)
Dept: PRIMARY CARE CLINIC | Facility: CLINIC | Age: 67
End: 2022-12-08
Payer: COMMERCIAL

## 2022-12-08 DIAGNOSIS — Z43.2 ILEOSTOMY CARE: Primary | ICD-10-CM

## 2022-12-08 RX ORDER — TRIAMCINOLONE ACETONIDE 1 MG/G
CREAM TOPICAL 2 TIMES DAILY
COMMUNITY
End: 2022-12-08 | Stop reason: CLARIF

## 2022-12-08 RX ORDER — TRIAMCINOLONE ACETONIDE 1 MG/G
CREAM TOPICAL 3 TIMES DAILY
Qty: 15 G | Refills: 5 | Status: SHIPPED | OUTPATIENT
Start: 2022-12-08 | End: 2024-03-25 | Stop reason: SDUPTHER

## 2022-12-14 ENCOUNTER — DOCUMENTATION ONLY (OUTPATIENT)
Dept: PRIMARY CARE CLINIC | Facility: CLINIC | Age: 67
End: 2022-12-14
Payer: COMMERCIAL

## 2022-12-29 ENCOUNTER — PATIENT OUTREACH (OUTPATIENT)
Dept: ADMINISTRATIVE | Facility: HOSPITAL | Age: 67
End: 2022-12-29
Payer: COMMERCIAL

## 2023-01-18 ENCOUNTER — PATIENT MESSAGE (OUTPATIENT)
Dept: ADMINISTRATIVE | Facility: HOSPITAL | Age: 68
End: 2023-01-18
Payer: COMMERCIAL

## 2023-03-22 ENCOUNTER — DOCUMENTATION ONLY (OUTPATIENT)
Dept: PRIMARY CARE CLINIC | Facility: CLINIC | Age: 68
End: 2023-03-22
Payer: COMMERCIAL

## 2023-03-24 RX ORDER — CELECOXIB 200 MG/1
CAPSULE ORAL 2 TIMES DAILY
COMMUNITY
Start: 2023-03-19 | End: 2023-09-12 | Stop reason: SDUPTHER

## 2023-03-24 RX ORDER — TRIAMCINOLONE ACETONIDE 5 MG/G
OINTMENT TOPICAL
COMMUNITY
Start: 2023-03-19 | End: 2023-12-12

## 2023-04-03 ENCOUNTER — PATIENT MESSAGE (OUTPATIENT)
Dept: ADMINISTRATIVE | Facility: HOSPITAL | Age: 68
End: 2023-04-03
Payer: COMMERCIAL

## 2023-05-30 ENCOUNTER — PATIENT MESSAGE (OUTPATIENT)
Dept: ADMINISTRATIVE | Facility: HOSPITAL | Age: 68
End: 2023-05-30
Payer: COMMERCIAL

## 2023-05-31 ENCOUNTER — PATIENT OUTREACH (OUTPATIENT)
Dept: ADMINISTRATIVE | Facility: HOSPITAL | Age: 68
End: 2023-05-31
Payer: COMMERCIAL

## 2023-06-05 PROBLEM — Z43.3 COLOSTOMY CARE: Chronic | Status: ACTIVE | Noted: 2023-06-05

## 2023-06-05 PROBLEM — R03.0 ELEVATED BLOOD PRESSURE READING WITHOUT DIAGNOSIS OF HYPERTENSION: Status: ACTIVE | Noted: 2023-06-05

## 2023-06-05 NOTE — ASSESSMENT & PLAN NOTE
Prescribed levothyroxine 50 mcg daily.  Most recent TSH/thyroid function appears to be normal, continue current dose of thyroid supplementation medication.    Component Ref Range & Units 6 mo ago  (12/2/22) 1 yr ago  (9/28/21) 2 yr ago  (9/22/20) 3 yr ago  (9/10/19) 4 yr ago  (4/24/19) 4 yr ago  (3/19/19) 4 yr ago  (2/26/19)   Thyroid Stimulating Hormone 0.3500 - 4.9400 uIU/mL 2.5866  2.3377  2.1278  2.090 R  2.880 R  6.450 High  R  6.430 High  R

## 2023-06-05 NOTE — ASSESSMENT & PLAN NOTE
Prescribed lorazepam 0.5 mg daily p.r.n..  She no longer takes the medication, she exercises to do with her anxiety, I applauded her current treatment plan.

## 2023-06-05 NOTE — PROGRESS NOTES
"Subjective:       Patient ID: Cesia Lawson is a 67 y.o. female.    Chief Complaint: Follow-up (STEFANY)    Patient presents to the clinic today for chronic condition follow-up.  Doing well, no specific complaints, tolerating all medications, reporting no significant side effects or problems.    Last wellness exam was 12/06/2022.      Review of Systems   Constitutional: Negative.  Negative for fatigue and fever.   HENT: Negative.  Negative for sore throat and trouble swallowing.    Eyes: Negative.  Negative for redness and visual disturbance.   Respiratory: Negative.  Negative for chest tightness and shortness of breath.    Cardiovascular: Negative.  Negative for chest pain.   Gastrointestinal: Negative.  Negative for abdominal pain, blood in stool and nausea.   Endocrine: Negative.  Negative for cold intolerance, heat intolerance and polyuria.   Genitourinary: Negative.    Musculoskeletal: Negative.  Negative for arthralgias, gait problem and joint swelling.   Integumentary:  Negative for rash. Negative.   Neurological: Negative.  Negative for dizziness, weakness and headaches.   Psychiatric/Behavioral: Negative.  Negative for agitation and dysphoric mood.        Objective:   Visit Vitals  /84   Pulse 67   Ht 5' 3" (1.6 m)   Wt 64.9 kg (143 lb)   BMI 25.33 kg/m²        Physical Exam  Constitutional:       Appearance: Normal appearance.   Eyes:      Conjunctiva/sclera: Conjunctivae normal.   Cardiovascular:      Rate and Rhythm: Normal rate and regular rhythm.   Pulmonary:      Effort: Pulmonary effort is normal.      Breath sounds: Normal breath sounds.   Skin:     General: Skin is warm and dry.   Neurological:      Mental Status: She is alert.   Psychiatric:         Mood and Affect: Mood normal.         Behavior: Behavior normal.         Lab Results   Component Value Date     12/02/2022    K 4.1 12/02/2022    CO2 24 12/02/2022    BUN 11.7 12/02/2022    CREATININE 0.83 12/02/2022    CALCIUM 9.5 " 12/02/2022    ALBUMIN 4.2 12/02/2022    BILITOT 0.7 12/02/2022    ALKPHOS 54 12/02/2022    AST 14 12/02/2022    ALT 14 12/02/2022    EGFRNORACEVR >60 12/02/2022     Lab Results   Component Value Date    WBC 6.8 12/02/2022    RBC 4.38 12/02/2022    HGB 12.9 12/02/2022    HCT 38.9 12/02/2022    MCV 88.8 12/02/2022    RDW 12.5 12/02/2022     12/02/2022      Lab Results   Component Value Date    CHOL 201 (H) 12/02/2022    TRIG 151 (H) 12/02/2022    HDL 43 12/02/2022    .00 12/02/2022    TOTALCHOLEST 5 12/02/2022     Lab Results   Component Value Date    TSH 2.5866 12/02/2022     Lab Results   Component Value Date    HGBA1C 5.2 12/02/2022          Assessment:     Problem List Items Addressed This Visit          Psychiatric    Generalized anxiety disorder (Chronic)    Current Assessment & Plan     Prescribed lorazepam 0.5 mg daily p.r.n..  She no longer takes the medication, she exercises to do with her anxiety, I applauded her current treatment plan.            Cardiac/Vascular    Hypertriglyceridemia (Chronic)    Current Assessment & Plan     Consume a diet low in saturated fats, (fats that are solid at room temperature such as animal fat) and trans fats, using healthy fats if necessary, olive oil and canola oil are 2 examples.  Increasing daily fiber intake can be very important in controlling cholesterol elevation as well.  Weight loss, especially weight loss associated with exercise can significantly lower lipid levels.  During future visits, depending on response to dietary changes, medication or change in dosage if already on lipid lowering medications may be considered if lipid levels continue to be significantly elevated.            Endocrine    Acquired hypothyroidism - Primary (Chronic)    Current Assessment & Plan     Prescribed levothyroxine 50 mcg daily.  Most recent TSH/thyroid function appears to be normal, continue current dose of thyroid supplementation medication.    Component Ref Range &  "Units 6 mo ago  (12/2/22) 1 yr ago  (9/28/21) 2 yr ago  (9/22/20) 3 yr ago  (9/10/19) 4 yr ago  (4/24/19) 4 yr ago  (3/19/19) 4 yr ago  (2/26/19)   Thyroid Stimulating Hormone 0.3500 - 4.9400 uIU/mL 2.5866  2.3377  2.1278  2.090 R  2.880 R  6.450 High  R  6.430 High  R                  GI    Colostomy care (Chronic)    Current Assessment & Plan     Patient does have a chronic colostomy from colon cancer surgery.  Notably, she no longer has a colon.          Other Visit Diagnoses       Wellness examination        This diagnosis does not apply to this visit, wellness exam with pre visit labs will be scheduled/ordered for future visit.               Current Outpatient Medications   Medication Instructions    ADAPT BARRIER RING 2 " Misc use as directed    celecoxib (CELEBREX) 200 MG capsule Oral, 2 times daily    levothyroxine (SYNTHROID) 50 mcg, Oral, Daily    LORazepam (ATIVAN) 0.5 mg, Oral, Every 8 hours PRN    mupirocin (BACTROBAN) 2 % ointment Topical (Top), 3 times daily    NEW IMAGE DRAINABLE POUCH 2 3/4 " Misc use as directed    NEW IMAGE FLEXWEAR FLAT 2 3/4 " Misc use as directed. CHANGE every 3 DAYS    triamcinolone (KENALOG) 0.5 % ointment Topical (Top)    triamcinolone acetonide 0.1% (KENALOG) 0.1 % cream Topical (Top), 3 times daily     Plan:             Follow up in about 27 weeks (around 12/12/2023) for Medicare Wellness.  "

## 2023-06-06 ENCOUNTER — OFFICE VISIT (OUTPATIENT)
Dept: PRIMARY CARE CLINIC | Facility: CLINIC | Age: 68
End: 2023-06-06
Payer: MEDICARE

## 2023-06-06 VITALS
WEIGHT: 143 LBS | BODY MASS INDEX: 25.34 KG/M2 | HEART RATE: 67 BPM | DIASTOLIC BLOOD PRESSURE: 84 MMHG | SYSTOLIC BLOOD PRESSURE: 129 MMHG | HEIGHT: 63 IN

## 2023-06-06 DIAGNOSIS — Z43.3 COLOSTOMY CARE: Chronic | ICD-10-CM

## 2023-06-06 DIAGNOSIS — F41.1 GENERALIZED ANXIETY DISORDER: Chronic | ICD-10-CM

## 2023-06-06 DIAGNOSIS — E78.1 HYPERTRIGLYCERIDEMIA: Chronic | ICD-10-CM

## 2023-06-06 DIAGNOSIS — E03.9 ACQUIRED HYPOTHYROIDISM: Primary | Chronic | ICD-10-CM

## 2023-06-06 DIAGNOSIS — Z00.00 WELLNESS EXAMINATION: ICD-10-CM

## 2023-06-06 PROBLEM — C18.9 MALIGNANT NEOPLASM OF COLON: Status: RESOLVED | Noted: 2022-07-31 | Resolved: 2023-06-06

## 2023-06-06 LAB — BCS RECOMMENDATION EXT: NORMAL

## 2023-06-06 PROCEDURE — 99213 OFFICE O/P EST LOW 20 MIN: CPT | Mod: ,,, | Performed by: GENERAL PRACTICE

## 2023-06-06 PROCEDURE — 99213 PR OFFICE/OUTPT VISIT, EST, LEVL III, 20-29 MIN: ICD-10-PCS | Mod: ,,, | Performed by: GENERAL PRACTICE

## 2023-06-06 NOTE — ASSESSMENT & PLAN NOTE
Patient does have a chronic colostomy from colon cancer surgery.  Notably, she no longer has a colon.

## 2023-06-08 ENCOUNTER — PATIENT OUTREACH (OUTPATIENT)
Dept: ADMINISTRATIVE | Facility: HOSPITAL | Age: 68
End: 2023-06-08
Payer: COMMERCIAL

## 2023-06-08 ENCOUNTER — DOCUMENTATION ONLY (OUTPATIENT)
Dept: PRIMARY CARE CLINIC | Facility: CLINIC | Age: 68
End: 2023-06-08
Payer: COMMERCIAL

## 2023-07-19 RX ORDER — LORAZEPAM 0.5 MG/1
0.5 TABLET ORAL EVERY 8 HOURS PRN
Qty: 90 TABLET | Refills: 1 | Status: SHIPPED | OUTPATIENT
Start: 2023-07-19 | End: 2024-01-15

## 2023-08-16 NOTE — LETTER
AUTHORIZATION FOR RELEASE OF   CONFIDENTIAL INFORMATION    Dear Breast Winifred,    We are seeing Cesia Lawson, date of birth 1955, in the clinic at [facility]. Tavo Rivera MD is the patient's PCP. Cesia Lawson has an outstanding lab/procedure at the time we reviewed her chart. In order to help keep her health information updated, she has authorized us to request the following medical record(s):        ( X )  MAMMOGRAM                                      (  )  COLONOSCOPY      (  )  PAP SMEAR                                          (  )  OUTSIDE LAB RESULTS     (  )  DEXA SCAN                                          (  )  EYE EXAM            (  )  FOOT EXAM                                          (  )  ENTIRE RECORD     (  )  OUTSIDE IMMUNIZATIONS                 (  )  _______________         Please fax records to [facility], Pernell J Simon, MD, [phone]     If you have any questions, please contact Jasmyne Barron LPN at [phone].           Patient Name: Cesia Lawson  : 1955  Patient Phone #: [phone]      Chief complaint:   Chief Complaint   Patient presents with   • Illness     Left ear pain started this morning       Vitals:  Visit Vitals  Pulse 110   Temp 97.3 °F (36.3 °C) (Temporal)   Resp 20   Wt 25.3 kg (55 lb 12.4 oz)       HISTORY OF PRESENT ILLNESS     Patient here with mother and history given by patient.  He woke up this morning with both ears hurting .   Patient is not any cold symptoms fevers or ear discharge.  They have been swimming  Also heart murmur heard in walk-in visit and mom would like that checked today.  Patient has not had any chest pain or shortness of.      Other significant problems:  Patient Active Problem List    Diagnosis Date Noted   • BMI (body mass index), pediatric, 5% to less than 85% for age 02/11/2020     Priority: Low   • Encounter for routine child health examination without abnormal findings 07/01/2019     Priority: Low   • Vaccination refused by parent 07/14/2017     Priority: Low       PAST MEDICAL, FAMILY AND SOCIAL HISTORY     Medications:  Current Outpatient Medications   Medication Sig Dispense Refill   • ofloxacin (OCUFLOX) 0.3 % ophthalmic solution Apply 5 drops daily in ear for 5 days 5 mL 0   • Multiple Vitamins-Minerals (vitamin - therapeutic multivitamins w/minerals) tablet        No current facility-administered medications for this visit.       Allergies:  ALLERGIES:   Allergen Reactions   • Red Dye Other (See Comments)     Parent is unsure of reaction        Past Medical  History/Surgeries:  No past medical history on file.    No past surgical history on file.    Family History:  No family history on file.    Social History:  Social History     Tobacco Use   • Smoking status: Not on file   • Smokeless tobacco: Not on file   Substance Use Topics   • Alcohol use: Not on file       REVIEW OF SYSTEMS     Review of Systems   Constitutional: Negative for activity change, appetite change, fatigue, fever and irritability.   HENT: Positive for ear pain. Negative for  congestion, ear discharge, hearing loss, rhinorrhea, sore throat, trouble swallowing and voice change.    Eyes: Negative for discharge.   Respiratory: Negative for cough, chest tightness and shortness of breath.    Gastrointestinal: Negative for abdominal pain, diarrhea and vomiting.   Musculoskeletal: Negative for arthralgias, myalgias and neck pain.   Skin: Negative for color change, pallor and rash.   Neurological: Negative for weakness and headaches.   Hematological: Negative for adenopathy.       PHYSICAL EXAM     Physical Exam  Constitutional:       General: He is active.      Appearance: He is well-developed.   HENT:      Head: Normocephalic.      Right Ear: Tympanic membrane normal. Tenderness present. No middle ear effusion. Tympanic membrane is not injected.      Left Ear: Tympanic membrane normal. No pain on movement. Tenderness present.  No middle ear effusion. Tympanic membrane is not injected.      Nose: No congestion.      Mouth/Throat:      Mouth: Mucous membranes are moist.      Tonsils: No tonsillar exudate.      Neck: Normal range of motion and neck supple.   Eyes:      Conjunctiva/sclera: Conjunctivae normal.      Pupils: Pupils are equal, round, and reactive to light.   Cardiovascular:      Rate and Rhythm: Normal rate and regular rhythm.      Heart sounds: S1 normal and S2 normal. Murmur heard.       Systolic murmur is present with a grade of 2/6.     Comments: Grade 2 sm URSB, not louder in supine  Pulmonary:      Effort: Pulmonary effort is normal. No respiratory distress or retractions.      Breath sounds: Normal breath sounds and air entry. No stridor or decreased air movement. No wheezing, rhonchi or rales.   Abdominal:      General: Bowel sounds are normal.      Palpations: Abdomen is soft.      Tenderness: There is no abdominal tenderness.   Musculoskeletal:         General: Normal range of motion.   Skin:     General: Skin is warm.      Findings: No rash.   Neurological:      Mental  Status: He is alert.         ASSESSMENT/PLAN     OE  rx floxin    Heart murmur  Most likely stills- will refer to cardio

## 2023-09-12 ENCOUNTER — PATIENT MESSAGE (OUTPATIENT)
Dept: PRIMARY CARE CLINIC | Facility: CLINIC | Age: 68
End: 2023-09-12
Payer: COMMERCIAL

## 2023-09-12 DIAGNOSIS — M54.12 CERVICAL RADICULOPATHY: Primary | Chronic | ICD-10-CM

## 2023-09-12 RX ORDER — CELECOXIB 200 MG/1
200 CAPSULE ORAL 2 TIMES DAILY
Qty: 60 CAPSULE | Refills: 5 | Status: SHIPPED | OUTPATIENT
Start: 2023-09-12 | End: 2024-03-10

## 2023-10-18 DIAGNOSIS — E03.9 ACQUIRED HYPOTHYROIDISM: Primary | Chronic | ICD-10-CM

## 2023-10-18 RX ORDER — LEVOTHYROXINE SODIUM 50 UG/1
50 TABLET ORAL DAILY
Qty: 90 TABLET | Refills: 3 | Status: SHIPPED | OUTPATIENT
Start: 2023-10-18 | End: 2024-10-17

## 2023-12-08 ENCOUNTER — CLINICAL SUPPORT (OUTPATIENT)
Dept: PRIMARY CARE CLINIC | Facility: CLINIC | Age: 68
End: 2023-12-08
Payer: MEDICARE

## 2023-12-08 DIAGNOSIS — Z00.00 ENCOUNTER FOR ANNUAL WELLNESS EXAM IN MEDICARE PATIENT: Primary | ICD-10-CM

## 2023-12-08 PROCEDURE — 36415 PR COLLECTION VENOUS BLOOD,VENIPUNCTURE: ICD-10-PCS | Mod: ,,, | Performed by: GENERAL PRACTICE

## 2023-12-08 PROCEDURE — 36415 COLL VENOUS BLD VENIPUNCTURE: CPT | Mod: ,,, | Performed by: GENERAL PRACTICE

## 2023-12-08 NOTE — PROGRESS NOTES
Patient presented today for an Nurse Visit to have blood work drawn. One attempted draw to left arm with an 22 gauge needle, pt tolerated well with no complaints.

## 2023-12-12 ENCOUNTER — OFFICE VISIT (OUTPATIENT)
Dept: PRIMARY CARE CLINIC | Facility: CLINIC | Age: 68
End: 2023-12-12
Payer: MEDICARE

## 2023-12-12 VITALS
SYSTOLIC BLOOD PRESSURE: 138 MMHG | WEIGHT: 139 LBS | HEIGHT: 63 IN | DIASTOLIC BLOOD PRESSURE: 74 MMHG | BODY MASS INDEX: 24.63 KG/M2 | OXYGEN SATURATION: 98 % | RESPIRATION RATE: 18 BRPM | HEART RATE: 62 BPM

## 2023-12-12 DIAGNOSIS — Z85.828 HISTORY OF BASAL CELL CARCINOMA (BCC) EXCISION: Chronic | ICD-10-CM

## 2023-12-12 DIAGNOSIS — E03.9 ACQUIRED HYPOTHYROIDISM: Chronic | ICD-10-CM

## 2023-12-12 DIAGNOSIS — F41.1 GENERALIZED ANXIETY DISORDER: Chronic | ICD-10-CM

## 2023-12-12 DIAGNOSIS — Z00.00 MEDICARE ANNUAL WELLNESS VISIT, SUBSEQUENT: Primary | ICD-10-CM

## 2023-12-12 DIAGNOSIS — Z98.890 HISTORY OF BASAL CELL CARCINOMA (BCC) EXCISION: Chronic | ICD-10-CM

## 2023-12-12 DIAGNOSIS — E78.1 HYPERTRIGLYCERIDEMIA: Chronic | ICD-10-CM

## 2023-12-12 DIAGNOSIS — Z43.3 COLOSTOMY CARE: Chronic | ICD-10-CM

## 2023-12-12 PROBLEM — C44.90 MALIGNANT NEOPLASM OF SKIN: Status: RESOLVED | Noted: 2022-07-31 | Resolved: 2023-12-12

## 2023-12-12 PROCEDURE — G0439 PPPS, SUBSEQ VISIT: HCPCS | Mod: ,,, | Performed by: GENERAL PRACTICE

## 2023-12-12 PROCEDURE — G0439 PR MEDICARE ANNUAL WELLNESS SUBSEQUENT VISIT: ICD-10-PCS | Mod: ,,, | Performed by: GENERAL PRACTICE

## 2023-12-12 RX ORDER — OSTOMY SUPPLY 1 3/4"
EACH MISCELLANEOUS
Qty: 60 EACH | Refills: 11 | Status: SHIPPED | OUTPATIENT
Start: 2023-12-12 | End: 2024-12-12

## 2023-12-12 RX ORDER — COLOSTOMY BAGS 1 3/4"
EACH MISCELLANEOUS
Qty: 60 EACH | Refills: 11 | Status: SHIPPED | OUTPATIENT
Start: 2023-12-12 | End: 2024-12-12

## 2023-12-12 RX ORDER — OSTOMY SUPPLY 4" X 4"
EACH MISCELLANEOUS
Qty: 30 EACH | Refills: 11 | Status: SHIPPED | OUTPATIENT
Start: 2023-12-12 | End: 2024-12-12

## 2023-12-12 NOTE — ASSESSMENT & PLAN NOTE
Component Ref Range & Units 3 d ago  (12/8/23) 1 yr ago  (12/2/22) 2 yr ago  (9/28/21) 3 yr ago  (9/22/20) 4 yr ago  (9/10/19) 4 yr ago  (4/24/19) 4 yr ago  (3/19/19)   TSH 0.350 - 4.940 uIU/mL 3.418 2.5866 R 2.3377 R 2.1278 R 2.090 R 2.880 R 6.450 High  R        Most recent TSH/thyroid function appears to be normal, continue current dose of thyroid supplementation medication.

## 2023-12-12 NOTE — ASSESSMENT & PLAN NOTE
Component Ref Range & Units 3 d ago 1 yr ago 2 yr ago 3 yr ago 4 yr ago 5 yr ago   Cholesterol Total <=200 mg/dL 179 201 High  170 R 190 R 198 R 165 R   HDL Cholesterol 35 - 60 mg/dL 38 43 37 42 45 44   Triglyceride 37 - 140 mg/dL 133 151 High  307 High  249 High  243 High  R 174 High  R   Cholesterol/HDL Ratio 0 - 5 5 5 5 5 4.4 High  R 3.8 R   Very Low Density Lipoprotein  27 30 61 50 49 R 35 R   LDL Cholesterol 50.00 - 140.00 mg/dL 114.00 128.00 72.00 98.00 104 R 86 R        Consume a diet low in saturated fats, (fats that are solid at room temperature such as animal fat) and trans fats, using healthy fats if necessary, olive oil and canola oil are 2 examples.  Increasing daily fiber intake can be very important in controlling cholesterol elevation as well.  Weight loss, especially weight loss associated with exercise can significantly lower lipid levels.  During future visits, depending on response to dietary changes, medication or change in dosage if already on lipid lowering medications may be considered if lipid levels continue to be significantly elevated.

## 2023-12-12 NOTE — PROGRESS NOTES
Patient ID: [id number]     Chief Complaint: No chief complaint on file.      HPI:     Cesia Laswon is a 68 y.o. female here today for a Medicare Wellness. No other complaints today.       ----------------------------  STEFANY (generalized anxiety disorder)  History of colon cancer  History of endometrial cancer  History of skin cancer  Hyperthyroidism  Hypothyroidism     Past Surgical History:   Procedure Laterality Date    APPENDECTOMY      BREAST BIOPSY  2015    COLON SURGERY  06/10/2015    COLONOSCOPY  2015    Dr. Lopez Person    COLONOSCOPY W/ POLYPECTOMY      HYSTERECTOMY      MEDIPORT INSERTION, SINGLE      MEDIPORT REMOVAL      SIGMOIDOSCOPY  2019    Dr John Simms       Review of patient's allergies indicates:  No Known Allergies    No outpatient medications have been marked as taking for the 23 encounter (Appointment) with Tavo Rivera MD.       Social History     Socioeconomic History    Marital status:    Tobacco Use    Smoking status: Former     Current packs/day: 0.00     Average packs/day: 0.5 packs/day for 53.6 years (26.8 ttl pk-yrs)     Types: Cigarettes     Start date: 1965     Quit date: 2018     Years since quittin.3    Smokeless tobacco: Never   Substance and Sexual Activity    Alcohol use: Not Currently    Drug use: Never        Family History   Problem Relation Age of Onset    Kidney cancer Mother     Lung cancer Father     Rectal cancer Sister     Rectal cancer Brother         Patient Care Team:  Tavo Rivera MD as PCP - General (Family Medicine)  [facility] -  Eddy Miller LPN as Care Coordinator     Opioid Screening: Patient medication list reviewed, patient is not taking prescription opioids. Patient is not using additional opioids than prescribed. Patient is at low risk of substance abuse based on this opioid use history.       Subjective:     Review of Systems   Constitutional: Negative.  Negative for  malaise/fatigue and weight loss.   HENT: Negative.     Eyes: Negative.    Respiratory: Negative.  Negative for shortness of breath.    Cardiovascular: Negative.  Negative for chest pain.   Gastrointestinal: Negative.    Genitourinary: Negative.    Musculoskeletal: Negative.    Skin: Negative.    Neurological: Negative.  Negative for focal weakness, weakness and headaches.   Endo/Heme/Allergies: Negative.    Psychiatric/Behavioral: Negative.  Negative for depression and memory loss. The patient is not nervous/anxious.          Patient Reported Health Risk Assessment       Objective:     There were no vitals taken for this visit.    Physical Exam  Constitutional:       Appearance: Normal appearance.   HENT:      Head: Normocephalic.      Mouth/Throat:      Mouth: Mucous membranes are moist.      Pharynx: Oropharynx is clear.   Eyes:      Conjunctiva/sclera: Conjunctivae normal.      Pupils: Pupils are equal, round, and reactive to light.   Cardiovascular:      Rate and Rhythm: Normal rate and regular rhythm.      Heart sounds: Normal heart sounds.   Pulmonary:      Effort: Pulmonary effort is normal.      Breath sounds: Normal breath sounds.   Abdominal:      General: Abdomen is flat.      Palpations: Abdomen is soft.   Musculoskeletal:         General: Normal range of motion.      Cervical back: Neck supple.   Skin:     General: Skin is warm and dry.   Neurological:      General: No focal deficit present.      Mental Status: She is alert and oriented to person, place, and time.   Psychiatric:         Mood and Affect: Mood normal.         Behavior: Behavior normal.         Thought Content: Thought content normal.         Judgment: Judgment normal.         Lab Results   Component Value Date     12/08/2023    K 4.4 12/08/2023    CO2 25 12/08/2023    BUN 15.6 12/08/2023    CREATININE 0.79 12/08/2023    CALCIUM 9.7 12/08/2023    ALBUMIN 4.2 12/08/2023    BILITOT 0.6 12/08/2023    ALKPHOS 59 12/08/2023    AST 13  12/08/2023    ALT 11 12/08/2023    EGFRNORACEVR >60 12/08/2023     Lab Results   Component Value Date    WBC 6.90 12/08/2023    RBC 4.53 12/08/2023    HGB 13.3 12/08/2023    HCT 40.4 12/08/2023    MCV 89.2 12/08/2023    RDW 12.8 12/08/2023     12/08/2023      Lab Results   Component Value Date    CHOL 179 12/08/2023    TRIG 133 12/08/2023    HDL 38 12/08/2023    .00 12/08/2023    TOTALCHOLEST 5 12/08/2023     Lab Results   Component Value Date    TSH 3.418 12/08/2023     Lab Results   Component Value Date    HGBA1C 5.0 12/08/2023             No data to display                  6/6/2023    10:15 AM 12/6/2022    10:30 AM 7/31/2022     9:55 AM   Fall Risk Assessment - Outpatient   Mobility Status Ambulatory Ambulatory Ambulatory   Number of falls 0 0 0   Identified as fall risk False False False              Assessment:       ICD-10-CM ICD-9-CM   1. Medicare annual wellness visit, subsequent  Z00.00 V70.0   2. Hypertriglyceridemia  E78.1 272.1   3. Acquired hypothyroidism  E03.9 244.9   4. Generalized anxiety disorder  F41.1 300.02        Plan:     Medicare Annual Wellness and Personalized Prevention Plan:   Fall Risk + Home Safety + Hearing Impairment + Depression Screen + Cognitive Impairment Screen + Health Risk Assessment all reviewed.       Health Maintenance Topics with due status: Not Due       Topic Last Completion Date    Mammogram 06/06/2023    Hemoglobin A1c (Diabetic Prevention Screening) 12/08/2023    Lipid Panel 12/08/2023      The patient's Health Maintenance was reviewed and the following appears to be due at this time:   Health Maintenance Due   Topic Date Due    TETANUS VACCINE  Never done    Shingles Vaccine (1 of 2) Never done    DEXA Scan  Never done    RSV Vaccine (Age 60+ and Pregnant patients) (1 - 1-dose 60+ series) Never done    LDCT Lung Screen  10/05/2016    Pneumococcal Vaccines (Age 65+) (2 - PCV) 08/14/2019    Influenza Vaccine (1) 09/01/2023    COVID-19 Vaccine (7 - 2023-24  Pulmonology season) 09/01/2023     Health risk assessment:  After review of the patient's medical record as it relates to health risk assessment over the next 5-10 years per recommendations of the USPSTF, it was determined that all pertinent recommendations have been appropriately addressed. The patient does not desire any further preventative care measures or screening tests at this time.  We will continue to reassess at each annual visit.    1. Medicare annual wellness visit, subsequent  Comments:  Overall health status was reviewed.  Good health habits reinforced.  Annual wellness exams recommended.    2. Hypertriglyceridemia  Overview:  Not prescribed any lipid-lowering medications.    Assessment & Plan:            Component Ref Range & Units 3 d ago 1 yr ago 2 yr ago 3 yr ago 4 yr ago 5 yr ago   Cholesterol Total <=200 mg/dL 179 201 High  170 R 190 R 198 R 165 R   HDL Cholesterol 35 - 60 mg/dL 38 43 37 42 45 44   Triglyceride 37 - 140 mg/dL 133 151 High  307 High  249 High  243 High  R 174 High  R   Cholesterol/HDL Ratio 0 - 5 5 5 5 5 4.4 High  R 3.8 R   Very Low Density Lipoprotein  27 30 61 50 49 R 35 R   LDL Cholesterol 50.00 - 140.00 mg/dL 114.00 128.00 72.00 98.00 104 R 86 R        Consume a diet low in saturated fats, (fats that are solid at room temperature such as animal fat) and trans fats, using healthy fats if necessary, olive oil and canola oil are 2 examples.  Increasing daily fiber intake can be very important in controlling cholesterol elevation as well.  Weight loss, especially weight loss associated with exercise can significantly lower lipid levels.  During future visits, depending on response to dietary changes, medication or change in dosage if already on lipid lowering medications may be considered if lipid levels continue to be significantly elevated.      3. Acquired hypothyroidism  Overview:  Prescribed levothyroxine 50 mcg daily.    Assessment & Plan:             Component Ref Range & Units 3 d  ago  (12/8/23) 1 yr ago  (12/2/22) 2 yr ago  (9/28/21) 3 yr ago  (9/22/20) 4 yr ago  (9/10/19) 4 yr ago  (4/24/19) 4 yr ago  (3/19/19)   TSH 0.350 - 4.940 uIU/mL 3.418 2.5866 R 2.3377 R 2.1278 R 2.090 R 2.880 R 6.450 High  R        Most recent TSH/thyroid function appears to be normal, continue current dose of thyroid supplementation medication.      4. Generalized anxiety disorder  Overview:  Prescribed lorazepam 0.5 mg, takes one tablets daily fairly regularly.    Assessment & Plan:  Patient has a diagnosis which involves the sensation of anxiety, and is prescribed a benzodiazepine.  As discussed today, this medication is prescribed to be used as needed for anxiety.  It is recommended that this medication be used only on an as-needed basis, not regularly, only for episodes of anxiety.  If there is the need for regular dosing of a benzodiazepine for chronic ongoing anxiety disorder, re-evaluation may be necessary, including possible referral for psychiatric evaluation.  With regular use of benzodiazepines, there are concerns about risks of dependence, impairment if this medication is taking regularly, (especially while working or driving) and potential development of physical tolerance if medication is used on a regular long-term basis, which may and probably will include difficulty either weaning from or discontinuing the medication due to rebound anxiety.              Advance Care Planning     Date: 12/11/2023    Living Will  During this visit, I engaged the patient  in the voluntary advance care planning process.  The patient and I reviewed the role for advance directives and their purpose in directing future healthcare if the patient's unable to speak for him/herself.  At this point in time, the patient does have full decision-making capacity.  We discussed different extreme health states that she could experience, and reviewed what kind of medical care she would want in those situations.  The patient  "communicated that if she were comatose and had little chance of a meaningful recovery, she would not want machines/life-sustaining treatments used. In addition to the above preference, other important end-of-life issues for the patient include no other issues.  Advanced care planning paperwork given to patient to bring home and discuss with the family.  Once signed, patient should bring form back for for the purposes of entering it into the medical record.  I spent a total of 5 minutes engaging the patient in this advance care planning discussion.              Current Outpatient Medications   Medication Instructions    ADAPT BARRIER RING 2 " Misc use as directed    celecoxib (CELEBREX) 200 mg, Oral, 2 times daily    levothyroxine (SYNTHROID) 50 mcg, Oral, Daily    LORazepam (ATIVAN) 0.5 mg, Oral, Every 8 hours PRN    mupirocin (BACTROBAN) 2 % ointment Topical (Top), 3 times daily    NEW IMAGE DRAINABLE POUCH 2 3/4 " Misc use as directed    NEW IMAGE FLEXWEAR FLAT 2 3/4 " Misc use as directed. CHANGE every 3 DAYS    triamcinolone (KENALOG) 0.5 % ointment Topical (Top)    triamcinolone acetonide 0.1% (KENALOG) 0.1 % cream Topical (Top), 3 times daily        No follow-ups on file. In addition to their scheduled follow up, the patient has also been instructed to follow up on as needed basis.     "

## 2023-12-12 NOTE — ASSESSMENT & PLAN NOTE
Patient has a diagnosis which involves the sensation of anxiety, and is prescribed a benzodiazepine.  As discussed today, this medication is prescribed to be used as needed for anxiety.  It is recommended that this medication be used only on an as-needed basis, not regularly, only for episodes of anxiety.  If there is the need for regular dosing of a benzodiazepine for chronic ongoing anxiety disorder, re-evaluation may be necessary, including possible referral for psychiatric evaluation.  With regular use of benzodiazepines, there are concerns about risks of dependence, impairment if this medication is taking regularly, (especially while working or driving) and potential development of physical tolerance if medication is used on a regular long-term basis, which may and probably will include difficulty either weaning from or discontinuing the medication due to rebound anxiety.

## 2024-01-04 ENCOUNTER — OFFICE VISIT (OUTPATIENT)
Dept: URGENT CARE | Facility: CLINIC | Age: 69
End: 2024-01-04
Payer: MEDICARE

## 2024-01-04 ENCOUNTER — PATIENT MESSAGE (OUTPATIENT)
Dept: PRIMARY CARE CLINIC | Facility: CLINIC | Age: 69
End: 2024-01-04
Payer: COMMERCIAL

## 2024-01-04 VITALS
DIASTOLIC BLOOD PRESSURE: 96 MMHG | HEIGHT: 63 IN | OXYGEN SATURATION: 98 % | BODY MASS INDEX: 24.63 KG/M2 | HEART RATE: 69 BPM | SYSTOLIC BLOOD PRESSURE: 199 MMHG | WEIGHT: 139 LBS | TEMPERATURE: 99 F | RESPIRATION RATE: 18 BRPM

## 2024-01-04 DIAGNOSIS — R30.0 DYSURIA: Primary | ICD-10-CM

## 2024-01-04 DIAGNOSIS — U07.1 COVID: ICD-10-CM

## 2024-01-04 DIAGNOSIS — B96.89 ACUTE BACTERIAL BRONCHITIS: ICD-10-CM

## 2024-01-04 DIAGNOSIS — J20.8 ACUTE BACTERIAL BRONCHITIS: ICD-10-CM

## 2024-01-04 DIAGNOSIS — R05.9 COUGH, UNSPECIFIED TYPE: ICD-10-CM

## 2024-01-04 DIAGNOSIS — R09.81 NASAL CONGESTION: ICD-10-CM

## 2024-01-04 LAB
BILIRUB UR QL STRIP: NEGATIVE
CTP QC/QA: YES
CTP QC/QA: YES
GLUCOSE UR QL STRIP: NEGATIVE
KETONES UR QL STRIP: NEGATIVE
LEUKOCYTE ESTERASE UR QL STRIP: NEGATIVE
PH, POC UA: 5
POC BLOOD, URINE: NEGATIVE
POC MOLECULAR INFLUENZA A AGN: NEGATIVE
POC MOLECULAR INFLUENZA B AGN: NEGATIVE
POC NITRATES, URINE: NEGATIVE
PROT UR QL STRIP: NEGATIVE
SARS-COV-2 RDRP RESP QL NAA+PROBE: POSITIVE
SP GR UR STRIP: 1.01 (ref 1–1.03)
UROBILINOGEN UR STRIP-ACNC: NORMAL (ref 0.1–1.1)

## 2024-01-04 PROCEDURE — 81003 URINALYSIS AUTO W/O SCOPE: CPT | Mod: QW,,, | Performed by: FAMILY MEDICINE

## 2024-01-04 PROCEDURE — 87635 SARS-COV-2 COVID-19 AMP PRB: CPT | Mod: QW,,, | Performed by: FAMILY MEDICINE

## 2024-01-04 PROCEDURE — 99214 OFFICE O/P EST MOD 30 MIN: CPT | Mod: ,,, | Performed by: FAMILY MEDICINE

## 2024-01-04 PROCEDURE — 87502 INFLUENZA DNA AMP PROBE: CPT | Mod: QW,,, | Performed by: FAMILY MEDICINE

## 2024-01-04 PROCEDURE — 87086 URINE CULTURE/COLONY COUNT: CPT | Performed by: FAMILY MEDICINE

## 2024-01-04 RX ORDER — DOXYCYCLINE 100 MG/1
100 CAPSULE ORAL 2 TIMES DAILY
Qty: 14 CAPSULE | Refills: 0 | Status: SHIPPED | OUTPATIENT
Start: 2024-01-04 | End: 2024-01-11

## 2024-01-04 RX ORDER — ALBUTEROL SULFATE 90 UG/1
1-2 AEROSOL, METERED RESPIRATORY (INHALATION) EVERY 4 HOURS PRN
Qty: 18 G | Refills: 0 | Status: SHIPPED | OUTPATIENT
Start: 2024-01-04 | End: 2024-02-03

## 2024-01-04 RX ORDER — PROMETHAZINE HYDROCHLORIDE AND DEXTROMETHORPHAN HYDROBROMIDE 6.25; 15 MG/5ML; MG/5ML
5 SYRUP ORAL EVERY 6 HOURS PRN
Qty: 120 ML | Refills: 0 | Status: SHIPPED | OUTPATIENT
Start: 2024-01-04 | End: 2024-01-10

## 2024-01-04 RX ORDER — PREDNISONE 10 MG/1
30 TABLET ORAL DAILY
Qty: 15 TABLET | Refills: 0 | Status: SHIPPED | OUTPATIENT
Start: 2024-01-04 | End: 2024-01-27

## 2024-01-04 NOTE — PATIENT INSTRUCTIONS
Plan:   Chest x-ray negative for infiltrates    COVID positive.  Given that your symptoms began 8 days ago likely you are developing a secondary infection and complication to COVID.  We will treat you for a bronchitis.  Medications have been sent to the pharmacy.    Urine will be sent off for culture.  If culture negative and you were still symptomatic, we will refer you to Urology for further evaluation.  Someone will reach out to you in 2-3 days with the culture results.    Check your blood pressure at home before starting the steroids.  As long as your blood pressure comes back down to your baseline of 130/80 your clear to take the steroids at that time.    Start taking an allergy pill daily such as claritin, zyrtec, allegrea or xyzal. Also start using a nasal steroid spray such as flonase or nasacort daily.. Monitor for fever. Take tylenol/acetaminophen or ibuprofen as needed. Rest and hydrate. If symptoms persist or worsen, return to clinic or seek medical attention immediately.

## 2024-01-04 NOTE — PROGRESS NOTES
"Subjective:      Patient ID: Cesia Lawson is a 68 y.o. female.    Vitals:  height is 5' 3" (1.6 m) and weight is 63 kg (139 lb). Her temperature is 98.8 °F (37.1 °C). Her blood pressure is 199/96 (abnormal) and her pulse is 69. Her respiration is 18 and oxygen saturation is 98%.     Chief Complaint: Urinary Tract Infection ( Patient is a 68 y.o. female who presents to urgent care with complaints of bladder spasm, frequency, and painful urination x since yesterday . )     Patient is a 68 y.o. female who presents to urgent care with complaints of bladder spasm, frequency, and painful urination, fever ( at beginning of the week), cough,congestion, scratchy throat x since yesterday .  I did discuss her blood pressure.  Patient states she suffers from white coat syndrome.  States she typically has a blood pressure of 130/80.  Has a PCP and is aware.    Urinary Tract Infection     ROS   Objective:     Physical Exam   Constitutional: She is oriented to person, place, and time. She appears well-developed. She is cooperative.  Non-toxic appearance. She does not appear ill. No distress.   HENT:   Head: Normocephalic and atraumatic.   Ears:   Right Ear: Hearing and external ear normal.   Left Ear: Hearing and external ear normal.   Mouth/Throat: Oropharynx is clear and moist and mucous membranes are normal. No posterior oropharyngeal erythema (postnasal drip).   Eyes: Conjunctivae and lids are normal.   Neck: Trachea normal and phonation normal. Neck supple. No edema present. No erythema present. No neck rigidity present.   Cardiovascular: Normal rate.   Pulmonary/Chest: Effort normal and breath sounds normal. No stridor. No respiratory distress. She has no decreased breath sounds. She has no wheezes. She has no rhonchi. She has no rales.   Abdominal: Normal appearance.   Neurological: She is alert and oriented to person, place, and time. She exhibits normal muscle tone. Coordination normal.   Skin: Skin is warm, dry, " "intact, not diaphoretic and no rash.   Psychiatric: Her speech is normal and behavior is normal. Mood, judgment and thought content normal.   Nursing note and vitals reviewed.         Previous History      Review of patient's allergies indicates:  No Known Allergies    Past Medical History:   Diagnosis Date    STEFANY (generalized anxiety disorder)     History of colon cancer     History of endometrial cancer     History of skin cancer     Hyperthyroidism     Hypothyroidism 07/31/2022    Malignant neoplasm of skin 07/31/2022     Current Outpatient Medications   Medication Instructions    ADAPT BARRIER RING 2 " Misc use as directed    albuterol (VENTOLIN HFA) 90 mcg/actuation inhaler 1-2 puffs, Inhalation, Every 4 hours PRN, Rescue    celecoxib (CELEBREX) 200 mg, Oral, 2 times daily    doxycycline (MONODOX) 100 mg, Oral, 2 times daily    levothyroxine (SYNTHROID) 50 mcg, Oral, Daily    LORazepam (ATIVAN) 0.5 mg, Oral, Every 8 hours PRN    mupirocin (BACTROBAN) 2 % ointment Topical (Top), 3 times daily    NEW IMAGE DRAINABLE POUCH 2 3/4 " Misc use as directed    NEW IMAGE FLEXWEAR FLAT 2 3/4 " Misc use as directed. CHANGE every 3 DAYS    predniSONE (DELTASONE) 30 mg, Oral, Daily    promethazine-dextromethorphan (PROMETHAZINE-DM) 6.25-15 mg/5 mL Syrp 5 mLs, Oral, Every 6 hours PRN    triamcinolone acetonide 0.1% (KENALOG) 0.1 % cream Topical (Top), 3 times daily     Past Surgical History:   Procedure Laterality Date    APPENDECTOMY      BREAST BIOPSY  04/28/2015    COLON SURGERY  06/10/2015    COLONOSCOPY  05/13/2015    Dr. John Simms    COLONOSCOPY W/ POLYPECTOMY      HYSTERECTOMY      MEDIPORT INSERTION, SINGLE      MEDIPORT REMOVAL      SIGMOIDOSCOPY  02/14/2019    Dr John Simms     Family History   Problem Relation Age of Onset    Kidney cancer Mother     Lung cancer Father     Rectal cancer Sister     Rectal cancer Brother        Social History     Tobacco Use    Smoking status: Former     Current packs/day: " "0.00     Average packs/day: 0.5 packs/day for 53.6 years (26.8 ttl pk-yrs)     Types: Cigarettes     Start date: 1965     Quit date: 2018     Years since quittin.4     Passive exposure: Never    Smokeless tobacco: Never   Substance Use Topics    Alcohol use: Not Currently    Drug use: Never        Physical Exam      Vital Signs Reviewed   BP (!) 199/96   Pulse 69   Temp 98.8 °F (37.1 °C)   Resp 18   Ht 5' 3" (1.6 m)   Wt 63 kg (139 lb)   SpO2 98%   BMI 24.62 kg/m²        Procedures    Procedures     Labs     Results for orders placed or performed in visit on 24   POCT Urinalysis, Dipstick, Automated, W/O Scope   Result Value Ref Range    POC Blood, Urine Negative Negative, Positive Slide, Positive Tube    POC Bilirubin, Urine Negative Negative, Positive Slide, Positive Tube    POC Urobilinogen, Urine norm 0.1 - 1.1    POC Ketones, Urine Negative Negative, Positive Slide, Positive Tube    POC Protein, Urine Negative Negative, Positive Slide, Positive Tube    POC Nitrates, Urine Negative Negative, Positive Slide, Positive Tube    POC Glucose, Urine Negative Negative, Positive Slide, Positive Tube    pH, UA 5     POC Specific Gravity, Urine 1.015 1.003 - 1.029    POC Leukocytes, Urine Negative Negative, Positive Slide, Positive Tube   POCT COVID-19 Rapid Screening   Result Value Ref Range    POC Rapid COVID Positive (A) Negative     Acceptable Yes    POCT Influenza A/B Molecular   Result Value Ref Range    POC Molecular Influenza A Ag Negative Negative, Not Reported    POC Molecular Influenza B Ag Negative Negative, Not Reported     Acceptable Yes        Assessment:     1. Dysuria    2. Nasal congestion    3. COVID    4. Cough, unspecified type    5. Acute bacterial bronchitis        Plan:   Chest x-ray negative for infiltrates    COVID positive.  Given that your symptoms began 8 days ago likely you are developing a secondary infection and complication to COVID.  " We will treat you for a bronchitis.  Medications have been sent to the pharmacy.    Urine will be sent off for culture.  If culture negative and you were still symptomatic, we will refer you to Urology for further evaluation.  Someone will reach out to you in 2-3 days with the culture results.    Check your blood pressure at home before starting the steroids.  As long as your blood pressure comes back down to your baseline of 130/80 your clear to take the steroids at that time.    Start taking an allergy pill daily such as claritin, zyrtec, allegrea or xyzal. Also start using a nasal steroid spray such as flonase or nasacort daily.. Monitor for fever. Take tylenol/acetaminophen or ibuprofen as needed. Rest and hydrate. If symptoms persist or worsen, return to clinic or seek medical attention immediately.       Dysuria  -     POCT Urinalysis, Dipstick, Automated, W/O Scope  -     POCT COVID-19 Rapid Screening  -     POCT Influenza A/B Molecular  -     Urine culture    Nasal congestion  -     POCT COVID-19 Rapid Screening  -     POCT Influenza A/B Molecular    COVID  -     X-Ray Chest PA And Lateral; Future; Expected date: 01/04/2024    Cough, unspecified type  -     X-Ray Chest PA And Lateral; Future; Expected date: 01/04/2024    Acute bacterial bronchitis    Other orders  -     doxycycline (MONODOX) 100 MG capsule; Take 1 capsule (100 mg total) by mouth 2 (two) times daily. for 7 days  Dispense: 14 capsule; Refill: 0  -     promethazine-dextromethorphan (PROMETHAZINE-DM) 6.25-15 mg/5 mL Syrp; Take 5 mLs by mouth every 6 (six) hours as needed (cough).  Dispense: 120 mL; Refill: 0  -     albuterol (VENTOLIN HFA) 90 mcg/actuation inhaler; Inhale 1-2 puffs into the lungs every 4 (four) hours as needed for Wheezing or Shortness of Breath. Rescue  Dispense: 18 g; Refill: 0  -     predniSONE (DELTASONE) 10 MG tablet; Take 3 tablets (30 mg total) by mouth once daily. for 5 days  Dispense: 15 tablet; Refill: 0

## 2024-01-06 LAB — BACTERIA UR CULT: NORMAL

## 2024-01-09 ENCOUNTER — PATIENT MESSAGE (OUTPATIENT)
Dept: PRIMARY CARE CLINIC | Facility: CLINIC | Age: 69
End: 2024-01-09
Payer: COMMERCIAL

## 2024-01-09 ENCOUNTER — OFFICE VISIT (OUTPATIENT)
Dept: URGENT CARE | Facility: CLINIC | Age: 69
End: 2024-01-09
Payer: MEDICARE

## 2024-01-09 VITALS
HEIGHT: 63 IN | OXYGEN SATURATION: 98 % | HEART RATE: 104 BPM | SYSTOLIC BLOOD PRESSURE: 159 MMHG | RESPIRATION RATE: 18 BRPM | WEIGHT: 139 LBS | BODY MASS INDEX: 24.63 KG/M2 | DIASTOLIC BLOOD PRESSURE: 91 MMHG | TEMPERATURE: 98 F

## 2024-01-09 DIAGNOSIS — R00.2 PALPITATIONS: Primary | ICD-10-CM

## 2024-01-09 DIAGNOSIS — R03.0 ELEVATED BLOOD PRESSURE READING: ICD-10-CM

## 2024-01-09 DIAGNOSIS — R30.0 DYSURIA: ICD-10-CM

## 2024-01-09 PROCEDURE — 99213 OFFICE O/P EST LOW 20 MIN: CPT | Mod: ,,,

## 2024-01-09 RX ORDER — DOXYCYCLINE 100 MG/1
100 CAPSULE ORAL EVERY 12 HOURS
Qty: 6 CAPSULE | Refills: 0 | Status: SHIPPED | OUTPATIENT
Start: 2024-01-09 | End: 2024-01-12

## 2024-01-09 NOTE — PATIENT INSTRUCTIONS
Extension of doxycycline sent to pharmacy for another 3 days  Urology referral sent, someone will call with an appointment in the next 1-2 weeks  For any fever, flank pain, worsening urinary symptoms ect return to clinic or seek care immediatly.     Avoid any sodium and stimulants like decongestants of caffeine and increase your hydration  Monitor your blood pressure with your arm at the level of the heart for the next 1-2 weeks and keep a log for your pcp.   If you develop any chest pain, shortness of breath, palpitations, dizziness ect go to the ER immediatly.

## 2024-01-09 NOTE — PROGRESS NOTES
"Subjective:      Patient ID: Cesia Lawson is a 68 y.o. female.    Vitals:  height is 5' 3" (1.6 m) and weight is 63 kg (139 lb). Her temperature is 98.2 °F (36.8 °C). Her blood pressure is 151/86 (abnormal) and her pulse is 104. Her respiration is 18 and oxygen saturation is 98%.     Chief Complaint: Hypertension (Possible* hypertension x1w /Denies SOB, wheezing, chest pain or blurred vision /Covid + x5d )    A 69 y/o female present to the clinic with c/o multiple elevated blood pressure readings over the last week. She was dx with covid and a UTI over the last two weeks as well. She denies any chest pain but does reports a sensation of her heart beating hard at times as well as fatigue "heavy" breathing with activity occasionally over the last week.  She denies any vision changes, speech changes, neck pain, peripheral edema, or diaphoresis. She does also report a history of anxiety. She denies any current palpations or shortness of breath, chest pain or dizziness. Her nasal congestion and cough are resolved as well. She ambulated to the bathroom without any difficulty.     Hypertension  Associated symptoms include palpitations and shortness of breath. Pertinent negatives include no chest pain.       Constitution: Positive for fatigue. Negative for chills and fever.   HENT:  Positive for congestion.    Neck: neck negative.   Cardiovascular:  Positive for palpitations. Negative for chest pain, leg swelling, sob on exertion and passing out.   Eyes: Negative.    Respiratory:  Positive for cough and shortness of breath. Negative for sputum production, wheezing and asthma.    Gastrointestinal: Negative.    Genitourinary: Negative.    Allergic/Immunologic: Negative for asthma.      Objective:     Physical Exam   Constitutional: She is oriented to person, place, and time. She appears well-developed. She is cooperative.  Non-toxic appearance. She does not appear ill. No distress.   HENT:   Head: Normocephalic and " atraumatic.   Ears:   Right Ear: Hearing, tympanic membrane and external ear normal.   Left Ear: Hearing, tympanic membrane and external ear normal.   Nose: No congestion.   Mouth/Throat: Mucous membranes are normal. Mucous membranes are moist. Posterior oropharyngeal erythema present. Oropharynx is clear.   Eyes: Conjunctivae and lids are normal.   Neck: Trachea normal and phonation normal. Neck supple. No edema present. No erythema present. No neck rigidity present.   Cardiovascular: Normal rate, regular rhythm and normal heart sounds.   No murmur heard.  Pulmonary/Chest: Effort normal and breath sounds normal. No stridor. No respiratory distress. She has no decreased breath sounds. She has no wheezes. She has no rhonchi. She has no rales.   Abdominal: Normal appearance.   Neurological: She is alert and oriented to person, place, and time. She exhibits normal muscle tone.   Skin: Skin is warm, dry, intact, not diaphoretic and no rash. Capillary refill takes less than 2 seconds.   Psychiatric: Her speech is normal and behavior is normal. Mood normal.   Nursing note and vitals reviewed.      Assessment:     1. Palpitations    2. Dysuria    3. Elevated blood pressure reading        Plan:       Palpitations  -     Ambulatory referral/consult to Cardiology    Dysuria  -     Ambulatory referral/consult to Urology    Elevated blood pressure reading    Other orders  -     doxycycline (MONODOX) 100 MG capsule; Take 1 capsule (100 mg total) by mouth every 12 (twelve) hours. for 3 days  Dispense: 6 capsule; Refill: 0    EKG NSR rate of 79; intra atrial conduction delay, no ST changes     Attempted to get the patient in for same day cardiology appointment without success. Patient attempted to get in to her pcp office but cannot get an appointment until 1/30 of this month. Patient has no current palpitations, chest pain, or shortness of breath, is ambulating without any difficulty and is no acute distress. We will get her in  with the next available new patient appointment with [facility] and she will keep her scheduled pcp appt. Strict ER precautions discussed, patient verbalized understanding.

## 2024-01-14 ENCOUNTER — TELEPHONE (OUTPATIENT)
Dept: URGENT CARE | Facility: CLINIC | Age: 69
End: 2024-01-14
Payer: COMMERCIAL

## 2024-01-27 NOTE — PROGRESS NOTES
Subjective:       Patient ID: Cesia Lawson is a 68 y.o. female.    Chief Complaint: Hypertension    Established patient, presenting for discussion of recently elevated blood pressures.  No previous history of hypertension diagnosis.  Has been checking her blood pressures at home showing moderate elevation, systolic numbers in the 140s, diastolic numbers in the mid 80s.  She reports no chest pain, no dyspnea, no edema.  She had a few palpitations before she went to urgent Care, she had a normal EKG there, and she was referred to Cardiology for an evaluation.  She is wondering if she needs to go to see a cardiologist, she was referred to a urologist for bladder issues, and she would like to keep that appointment.  However, she has no significant past history of cardiovascular problems.  She has been asymptomatic since her urgent care visit.      Review of Systems   Constitutional: Negative.  Negative for fatigue and fever.   HENT: Negative.  Negative for sore throat and trouble swallowing.    Eyes: Negative.  Negative for redness and visual disturbance.   Respiratory: Negative.  Negative for chest tightness and shortness of breath.    Cardiovascular: Negative.  Negative for chest pain.   Gastrointestinal: Negative.  Negative for abdominal pain, blood in stool and nausea.   Endocrine: Negative.  Negative for cold intolerance, heat intolerance and polyuria.   Genitourinary: Negative.    Musculoskeletal: Negative.  Negative for arthralgias, gait problem and joint swelling.   Integumentary:  Negative for rash. Negative.   Neurological: Negative.  Negative for dizziness, weakness and headaches.   Psychiatric/Behavioral: Negative.  Negative for agitation and dysphoric mood.            Patient Care Team:  Tavo Rivera MD as PCP - General (Family Medicine)  [facility] -  Eddy Miller LPN as Care Coordinator  Objective:     Vitals:    01/29/24 0955 01/29/24 1000   BP: (!) 199/104 (!)  "169/99   Pulse: 76 69   Resp: 18    SpO2: 98%    Weight: 62.6 kg (138 lb)    Height: 5' 3" (1.6 m)    Body mass index is 24.45 kg/m².     Physical Exam  Constitutional:       Appearance: Normal appearance.   Eyes:      Conjunctiva/sclera: Conjunctivae normal.   Cardiovascular:      Rate and Rhythm: Normal rate and regular rhythm.   Pulmonary:      Effort: Pulmonary effort is normal.      Breath sounds: Normal breath sounds.   Skin:     General: Skin is warm and dry.   Neurological:      Mental Status: She is alert.   Psychiatric:         Mood and Affect: Mood normal.         Behavior: Behavior normal.           Assessment:       ICD-10-CM ICD-9-CM   1. Primary hypertension  I10 401.9   2. Colostomy care  Z43.3 V55.3       Current Outpatient Medications   Medication Instructions    ADAPT BARRIER RING 2 " Misc use as directed    albuterol (VENTOLIN HFA) 90 mcg/actuation inhaler 1-2 puffs, Inhalation, Every 4 hours PRN, Rescue    celecoxib (CELEBREX) 200 mg, Oral, 2 times daily    levothyroxine (SYNTHROID) 50 mcg, Oral, Daily    LORazepam (ATIVAN) 0.5 mg, Oral, Every 8 hours PRN    mupirocin (BACTROBAN) 2 % ointment Topical (Top), 3 times daily    NEW IMAGE DRAINABLE POUCH 2 3/4 " Misc use as directed    NEW IMAGE FLEXWEAR FLAT 2 3/4 " Misc use as directed. CHANGE every 3 DAYS    telmisartan (MICARDIS) 20 mg, Oral, Daily    triamcinolone acetonide 0.1% (KENALOG) 0.1 % cream Topical (Top), 3 times daily     Plan:     Problem List Items Addressed This Visit          Cardiac/Vascular    Primary hypertension - Primary (Chronic)    Overview     Prescribed telmisartan 10 mg daily.  Started on 01/29/2024.    Check blood pressures at home as directed.  Average blood pressures should be no more than 130/80.  Any persistent elevation above 140/90 would suggest significant hypertension, possibly requiring treatment.         Current Assessment & Plan     Patient will continue to monitor her blood pressures at home, we will keep " her next follow-up appointment in June, and make adjustments to her blood pressure regimen at that time if necessary.         Relevant Medications    telmisartan (MICARDIS) 20 MG Tab       GI    Colostomy care (Chronic)    Overview     Patient does have a chronic colostomy from colon cancer surgery.  Notably, she no longer has a colon.                     Follow up for Keep next follow-up in June.

## 2024-01-29 ENCOUNTER — OFFICE VISIT (OUTPATIENT)
Dept: PRIMARY CARE CLINIC | Facility: CLINIC | Age: 69
End: 2024-01-29
Payer: MEDICARE

## 2024-01-29 VITALS
HEART RATE: 69 BPM | SYSTOLIC BLOOD PRESSURE: 169 MMHG | BODY MASS INDEX: 24.45 KG/M2 | OXYGEN SATURATION: 98 % | WEIGHT: 138 LBS | RESPIRATION RATE: 18 BRPM | DIASTOLIC BLOOD PRESSURE: 99 MMHG | HEIGHT: 63 IN

## 2024-01-29 DIAGNOSIS — Z43.3 COLOSTOMY CARE: Chronic | ICD-10-CM

## 2024-01-29 DIAGNOSIS — I10 PRIMARY HYPERTENSION: Primary | ICD-10-CM

## 2024-01-29 PROCEDURE — 99213 OFFICE O/P EST LOW 20 MIN: CPT | Mod: ,,, | Performed by: GENERAL PRACTICE

## 2024-01-29 RX ORDER — TELMISARTAN 20 MG/1
20 TABLET ORAL DAILY
Qty: 90 TABLET | Refills: 3 | Status: SHIPPED | OUTPATIENT
Start: 2024-01-29 | End: 2025-01-28

## 2024-01-29 NOTE — ASSESSMENT & PLAN NOTE
Patient will continue to monitor her blood pressures at home, we will keep her next follow-up appointment in June, and make adjustments to her blood pressure regimen at that time if necessary.

## 2024-03-25 DIAGNOSIS — Z43.2 ILEOSTOMY CARE: ICD-10-CM

## 2024-03-25 RX ORDER — TRIAMCINOLONE ACETONIDE 1 MG/G
CREAM TOPICAL 3 TIMES DAILY
Qty: 15 G | Refills: 5 | Status: SHIPPED | OUTPATIENT
Start: 2024-03-25

## 2024-04-15 DIAGNOSIS — F41.1 GENERALIZED ANXIETY DISORDER: Primary | Chronic | ICD-10-CM

## 2024-04-15 RX ORDER — LORAZEPAM 0.5 MG/1
0.5 TABLET ORAL EVERY 8 HOURS PRN
Qty: 90 TABLET | Refills: 1 | Status: SHIPPED | OUTPATIENT
Start: 2024-04-15 | End: 2024-10-12

## 2024-06-11 NOTE — PROGRESS NOTES
"Subjective:       Patient ID: Cesia Lawson is a 68 y.o. female.    Chief Complaint: Hypertension and Follow-up    Patient presents to the clinic today for chronic condition follow-up.  Doing well, no specific complaints, tolerating all medications, reporting no significant side effects or problems.    Last wellness exam was 12/12/2023.    Chronic conditions being treated include but are not limited toPrimary HTN, hypertriglyceridemia, acquired hypothyroidism, generalized anxiety disorder.      Review of Systems   Constitutional: Negative.  Negative for fatigue and fever.   HENT: Negative.  Negative for sore throat and trouble swallowing.    Eyes: Negative.  Negative for redness and visual disturbance.   Respiratory: Negative.  Negative for chest tightness and shortness of breath.    Cardiovascular: Negative.  Negative for chest pain.   Gastrointestinal: Negative.  Negative for abdominal pain, blood in stool and nausea.   Endocrine: Negative.  Negative for cold intolerance, heat intolerance and polyuria.   Genitourinary: Negative.    Musculoskeletal: Negative.  Negative for arthralgias, gait problem and joint swelling.   Integumentary:  Negative for rash. Negative.   Neurological: Negative.  Negative for dizziness, weakness and headaches.   Psychiatric/Behavioral: Negative.  Negative for agitation and dysphoric mood.            Patient Care Team:  Tavo Rivera MD as PCP - General (Family Medicine)  [facility] -  Eddy Miller LPN as Care Coordinator  Nghia Chaudhry MD as Consulting Physician (Urology)  Rk Krishna MD (Dermatology)  Objective:   Visit Vitals  /82   Pulse 68   Resp 18   Ht 5' 3" (1.6 m)   Wt 61.2 kg (135 lb)   SpO2 99%   BMI 23.91 kg/m²        Physical Exam  Constitutional:       Appearance: Normal appearance.   HENT:      Head: Normocephalic.      Mouth/Throat:      Mouth: Mucous membranes are moist.      Pharynx: Oropharynx is clear.   Eyes: "      Conjunctiva/sclera: Conjunctivae normal.      Pupils: Pupils are equal, round, and reactive to light.   Cardiovascular:      Rate and Rhythm: Normal rate and regular rhythm.      Heart sounds: Normal heart sounds.   Pulmonary:      Effort: Pulmonary effort is normal.      Breath sounds: Normal breath sounds.   Abdominal:      General: Abdomen is flat.      Palpations: Abdomen is soft.   Musculoskeletal:         General: Normal range of motion.      Cervical back: Neck supple.   Skin:     General: Skin is warm and dry.   Neurological:      General: No focal deficit present.      Mental Status: She is alert and oriented to person, place, and time.   Psychiatric:         Mood and Affect: Mood normal.         Behavior: Behavior normal.         Thought Content: Thought content normal.         Judgment: Judgment normal.           Lab Results   Component Value Date     12/08/2023    K 4.4 12/08/2023     (H) 12/08/2023    CO2 25 12/08/2023    BUN 15.6 12/08/2023    CREATININE 0.79 12/08/2023    CALCIUM 9.7 12/08/2023    ALBUMIN 4.2 12/08/2023    BILITOT 0.6 12/08/2023    ALKPHOS 59 12/08/2023    AST 13 12/08/2023    ALT 11 12/08/2023    EGFRNORACEVR >60 12/08/2023     Lab Results   Component Value Date    WBC 6.90 12/08/2023    RBC 4.53 12/08/2023    HGB 13.3 12/08/2023    HCT 40.4 12/08/2023    MCV 89.2 12/08/2023    RDW 12.8 12/08/2023     12/08/2023      Lab Results   Component Value Date    CHOL 179 12/08/2023    TRIG 133 12/08/2023    HDL 38 12/08/2023    .00 12/08/2023    TOTALCHOLEST 5 12/08/2023     Lab Results   Component Value Date    TSH 3.418 12/08/2023     Lab Results   Component Value Date    HGBA1C 5.0 12/08/2023     Assessment:       ICD-10-CM ICD-9-CM   1. Primary hypertension  I10 401.9   2. Acquired hypothyroidism  E03.9 244.9   3. Generalized anxiety disorder  F41.1 300.02   4. Hypertriglyceridemia  E78.1 272.1   5. Wellness examination  Z00.00 V70.0   6. Abnormal blood  "chemistry  R79.9 790.6   7. Subcutaneous mass of left lower leg  R22.42 782.2       Current Outpatient Medications   Medication Instructions    ADAPT BARRIER RING 2 " Misc use as directed    celecoxib (CELEBREX) 200 mg, Oral, 2 times daily    levothyroxine (SYNTHROID) 50 mcg, Oral, Daily    LORazepam (ATIVAN) 0.5 mg, Oral, Every 8 hours PRN    NEW IMAGE DRAINABLE POUCH 2 3/4 " Misc use as directed    NEW IMAGE FLEXWEAR FLAT 2 3/4 " Misc use as directed. CHANGE every 3 DAYS    telmisartan (MICARDIS) 20 mg, Oral, Daily    triamcinolone acetonide 0.1% (KENALOG) 0.1 % cream Topical (Top), 3 times daily     Plan:     Problem List Items Addressed This Visit          Psychiatric    Generalized anxiety disorder (Chronic)    Overview     Prescribed lorazepam 0.5 mg, takes infrequently.    This medical condition is currently stable on prescription medication, continue current treatment plan.            Cardiac/Vascular    Hypertriglyceridemia (Chronic)    Overview     Dyslipidemia is being treated using lifestyle modification only.  Patient is not being prescribed lipid-lowering medication.  As discussed, we will continue to monitor this, and may ultimately choose to prescribe medication if this becomes difficult to control utilizing lifestyle modification only.         Relevant Orders    Lipid Panel    Primary hypertension - Primary (Chronic)    Overview     Prescribed telmisartan 10 mg daily.  Started on 01/29/2024.    Blood pressures appear to be adequately controlled with current treatment plan, including prescription blood pressure medication.  Medication(s) appear to be effective at current dosages, and are not causing any side effects or problems.  Continue medical management and continue home blood pressure checks.  Average blood pressures at home should be no greater than 130/80.  Patient instructed to contact the clinic if blood pressures become elevated at any point prior to next clinic visit.         Relevant " Orders    Comprehensive Metabolic Panel    CBC Auto Differential       Endocrine    Acquired hypothyroidism (Chronic)    Overview     Prescribed levothyroxine 50 mcg daily.    Most recent TSH/thyroid function appears to be normal, continue current dose of thyroid supplementation medication.         Relevant Orders    TSH       Other    Subcutaneous mass of left lower leg (Chronic)    Overview      Subcutaneous mass, lateral right knee area, probably a ganglion cyst, no need for investigation at this time, but possibly we would schedule an ultrasound in the future if there were any concerns.          Other Visit Diagnoses       Wellness examination        This diagnosis does not apply to this visit, wellness exam with pre visit labs will be scheduled/ordered for future visit.    Abnormal blood chemistry        Most recent comprehensive metabolic panel did show one or more abnormal values, requiring further testing for investigatory purposes.    Relevant Orders    Hemoglobin A1C                    Follow up in about 6 months (around 12/16/2024) for Medicare Wellness.

## 2024-06-12 ENCOUNTER — OFFICE VISIT (OUTPATIENT)
Dept: PRIMARY CARE CLINIC | Facility: CLINIC | Age: 69
End: 2024-06-12
Payer: MEDICARE

## 2024-06-12 VITALS
SYSTOLIC BLOOD PRESSURE: 130 MMHG | BODY MASS INDEX: 23.92 KG/M2 | HEART RATE: 68 BPM | RESPIRATION RATE: 18 BRPM | HEIGHT: 63 IN | OXYGEN SATURATION: 99 % | WEIGHT: 135 LBS | DIASTOLIC BLOOD PRESSURE: 82 MMHG

## 2024-06-12 DIAGNOSIS — R79.9 ABNORMAL BLOOD CHEMISTRY: ICD-10-CM

## 2024-06-12 DIAGNOSIS — E78.1 HYPERTRIGLYCERIDEMIA: Chronic | ICD-10-CM

## 2024-06-12 DIAGNOSIS — I10 PRIMARY HYPERTENSION: Primary | Chronic | ICD-10-CM

## 2024-06-12 DIAGNOSIS — F41.1 GENERALIZED ANXIETY DISORDER: Chronic | ICD-10-CM

## 2024-06-12 DIAGNOSIS — E03.9 ACQUIRED HYPOTHYROIDISM: Chronic | ICD-10-CM

## 2024-06-12 DIAGNOSIS — R22.42 SUBCUTANEOUS MASS OF LEFT LOWER LEG: Chronic | ICD-10-CM

## 2024-06-12 DIAGNOSIS — Z00.00 WELLNESS EXAMINATION: ICD-10-CM

## 2024-06-12 PROCEDURE — 99214 OFFICE O/P EST MOD 30 MIN: CPT | Mod: ,,, | Performed by: GENERAL PRACTICE

## 2024-06-12 RX ORDER — CELECOXIB 200 MG/1
200 CAPSULE ORAL 2 TIMES DAILY
COMMUNITY
Start: 2024-05-13

## 2024-06-26 ENCOUNTER — TELEPHONE (OUTPATIENT)
Dept: PRIMARY CARE CLINIC | Facility: CLINIC | Age: 69
End: 2024-06-26
Payer: COMMERCIAL

## 2024-06-26 DIAGNOSIS — Z12.31 SCREENING MAMMOGRAM FOR BREAST CANCER: Primary | ICD-10-CM

## 2024-06-26 NOTE — TELEPHONE ENCOUNTER
----- Message from Shae Vnan sent at 6/26/2024  2:38 PM CDT -----  Regarding: mammo order  Who Called: Cesia Bueno Viator    Caller is requesting to schedule their annual mammogram appointment. Order is not listed in Epic. Please enter order and contact patient to schedule.    Where would they like the mammogram performed? Breast center [facility]    Patient's Preferred Phone Number on File: [phone]     Additional Information: stated that she is needing an order for a mammo sent to the breast center. Stated that she has an appt tomorrow. Please advise

## 2024-06-27 LAB — BCS RECOMMENDATION EXT: NORMAL

## 2024-06-28 ENCOUNTER — DOCUMENTATION ONLY (OUTPATIENT)
Dept: PRIMARY CARE CLINIC | Facility: CLINIC | Age: 69
End: 2024-06-28
Payer: COMMERCIAL

## 2024-08-02 ENCOUNTER — DOCUMENTATION ONLY (OUTPATIENT)
Dept: PRIMARY CARE CLINIC | Facility: CLINIC | Age: 69
End: 2024-08-02
Payer: COMMERCIAL

## 2024-08-02 ENCOUNTER — PATIENT MESSAGE (OUTPATIENT)
Dept: PRIMARY CARE CLINIC | Facility: CLINIC | Age: 69
End: 2024-08-02
Payer: COMMERCIAL

## 2024-08-16 DIAGNOSIS — M54.12 CERVICAL RADICULOPATHY: Primary | ICD-10-CM

## 2024-08-16 NOTE — TELEPHONE ENCOUNTER
----- Message from Kristine Alves sent at 8/16/2024 10:53 AM CDT -----  Regarding: refill  Who Called: Cesia Bueno Viator    Refill or New Rx:Refill    RX Name and Strength:celecoxib (CELEBREX) 200 MG capsule    How is the patient currently taking it? (ex. 1XDay):    Is this a 30 day or 90 day RX:    Local or Mail Order:local     List of preferred pharmacies on file (remove unneeded): [facility] [id number] [place] - [facility] [place] [address]   Phone: [phone]  Fax: [phone]    Ordering Provider:    Preferred Method of Contact: Phone Call    Patient's Preferred Phone Number on File: [phone]     Best Call Back Number, if different:    Additional Information:

## 2024-08-19 RX ORDER — CELECOXIB 200 MG/1
200 CAPSULE ORAL 2 TIMES DAILY
Qty: 60 CAPSULE | Refills: 3 | Status: SHIPPED | OUTPATIENT
Start: 2024-08-19 | End: 2025-08-19

## 2024-09-12 ENCOUNTER — OFFICE VISIT (OUTPATIENT)
Dept: URGENT CARE | Facility: CLINIC | Age: 69
End: 2024-09-12
Payer: MEDICARE

## 2024-09-12 VITALS
TEMPERATURE: 98 F | DIASTOLIC BLOOD PRESSURE: 82 MMHG | SYSTOLIC BLOOD PRESSURE: 189 MMHG | RESPIRATION RATE: 17 BRPM | BODY MASS INDEX: 23.92 KG/M2 | WEIGHT: 135 LBS | HEIGHT: 63 IN | OXYGEN SATURATION: 97 % | HEART RATE: 71 BPM

## 2024-09-12 DIAGNOSIS — S81.811A LACERATION OF RIGHT LOWER EXTREMITY, INITIAL ENCOUNTER: Primary | ICD-10-CM

## 2024-09-12 RX ORDER — CEPHALEXIN 500 MG/1
500 CAPSULE ORAL EVERY 8 HOURS
Qty: 15 CAPSULE | Refills: 0 | Status: SHIPPED | OUTPATIENT
Start: 2024-09-12 | End: 2024-09-17

## 2024-09-12 RX ORDER — MUPIROCIN 20 MG/G
OINTMENT TOPICAL 3 TIMES DAILY
Qty: 15 G | Refills: 0 | Status: SHIPPED | OUTPATIENT
Start: 2024-09-12 | End: 2024-09-19

## 2024-09-12 RX ORDER — CEPHALEXIN 500 MG/1
500 CAPSULE ORAL EVERY 8 HOURS
Qty: 15 CAPSULE | Refills: 0 | Status: SHIPPED | OUTPATIENT
Start: 2024-09-12 | End: 2024-09-12

## 2024-09-12 NOTE — PATIENT INSTRUCTIONS
Keep wound clean with soap and water then dry immediately with bandage change and antibiotic ointment application 2-3 times daily.  Recommend may start Keflex antibiotic coverage if redness or concern for infection develops in the next 1-2 days.  Recommend alternate Tylenol and ibuprofen every 6-8 hours if needed for mild-to-moderate pain and inflammation.  Plan for suture removal in approximately 10 days.  Recommend follow-up with primary care physician or urgent care in 3-5 days for wound check if needed.

## 2024-09-12 NOTE — PROGRESS NOTES
"Subjective:      Patient ID: Cesia Lawson is a 68 y.o. female.    Vitals:  height is 5' 3" (1.6 m) and weight is 61.2 kg (135 lb). Her temperature is 98.1 °F (36.7 °C). Her blood pressure is 189/82 (abnormal) and her pulse is 71. Her respiration is 17 and oxygen saturation is 97%.     Chief Complaint: Laceration     Patient is a 68 y.o. female who presents to urgent care with complaints of left shin laceration after hitting a metal trailer today.     Laceration       Skin:  Positive for laceration. Negative for erythema.      Objective:     Physical Exam   Constitutional: She is oriented to person, place, and time. She appears well-developed.   HENT:   Head: Normocephalic and atraumatic.   Eyes: EOM are normal.   Neck: Trachea normal and phonation normal. Neck supple.   Cardiovascular: Normal rate, regular rhythm and normal pulses.   Pulses:       Dorsalis pedis pulses are 2+ on the right side.   Pulmonary/Chest: Effort normal.   Musculoskeletal: Normal range of motion.         General: Normal range of motion.      Left lower leg: She exhibits laceration. She exhibits no bony tenderness.        Legs:    Neurological: no focal deficit. She is alert and oriented to person, place, and time. She displays no weakness. Gait normal.   Skin: Skin is warm, dry, intact and no rash. Capillary refill takes less than 2 seconds. Lacerations - lower ext.:  left lower leg No abrasion, No burn, No bruising, No erythema and No ecchymosis   Psychiatric: Her speech is normal.   Nursing note and vitals reviewed.       Previous History      Review of patient's allergies indicates:  No Known Allergies    Past Medical History:   Diagnosis Date    STEFANY (generalized anxiety disorder)     History of colon cancer     History of endometrial cancer     History of skin cancer     Hyperthyroidism     Hypothyroidism 07/31/2022    Malignant neoplasm of skin 07/31/2022     Current Outpatient Medications   Medication Instructions    ADAPT BARRIER " "RING 2 " Misc use as directed    celecoxib (CELEBREX) 200 mg, Oral, 2 times daily    cephALEXin (KEFLEX) 500 mg, Oral, Every 8 hours    levothyroxine (SYNTHROID) 50 mcg, Oral, Daily    LORazepam (ATIVAN) 0.5 mg, Oral, Every 8 hours PRN    mupirocin (BACTROBAN) 2 % ointment Topical (Top), 3 times daily    NEW IMAGE DRAINABLE POUCH 2 3/4 " Misc use as directed    NEW IMAGE FLEXWEAR FLAT 2 3/4 " Misc use as directed. CHANGE every 3 DAYS    telmisartan (MICARDIS) 20 mg, Oral, Daily    triamcinolone acetonide 0.1% (KENALOG) 0.1 % cream Topical (Top), 3 times daily     Past Surgical History:   Procedure Laterality Date    APPENDECTOMY      BREAST BIOPSY  2015    COLON SURGERY  06/10/2015    COLONOSCOPY  2015    Dr. John Simms    COLONOSCOPY W/ POLYPECTOMY      HYSTERECTOMY      MEDIPORT INSERTION, SINGLE      MEDIPORT REMOVAL      SIGMOIDOSCOPY  2019    Dr John Simms     Family History   Problem Relation Name Age of Onset    Kidney cancer Mother      Lung cancer Father      Rectal cancer Sister      Rectal cancer Brother         Social History     Tobacco Use    Smoking status: Former     Current packs/day: 0.00     Average packs/day: 0.5 packs/day for 53.6 years (26.8 ttl pk-yrs)     Types: Cigarettes     Start date: 1965     Quit date: 2018     Years since quittin.1     Passive exposure: Never    Smokeless tobacco: Never   Substance Use Topics    Alcohol use: Not Currently    Drug use: Never        Physical Exam      Vital Signs Reviewed   BP (!) 189/82   Pulse 71   Temp 98.1 °F (36.7 °C)   Resp 17   Ht 5' 3" (1.6 m)   Wt 61.2 kg (135 lb)   SpO2 97%   BMI 23.91 kg/m²        Procedures    Laceration Repair    Date/Time: 2024 11:35 AM    Performed by: Tate Griggs PA  Authorized by: Tate Griggs PA  Consent Done: Yes  Consent: Written consent obtained.  Consent given by: patient  Body area: lower extremity  Location details: right lower leg  Laceration " length: 2.5 cm  Foreign bodies: no foreign bodies  Tendon involvement: none  Nerve involvement: none  Vascular damage: no  Anesthesia: local infiltration    Anesthesia:  Local Anesthetic: lidocaine 1% without epinephrine  Anesthetic total: 7 mL  Irrigation solution: Saline, Hibiclens, Betadine.  Amount of cleaning: standard  Debridement: none  Skin closure: 4-0 Prolene  Number of sutures: 4  Technique: simple  Approximation: close  Approximation difficulty: simple  Dressing: adhesive bandage and antibiotic ointment  Patient tolerance: Patient tolerated the procedure well with no immediate complications           Labs     Results for orders placed or performed in visit on 06/28/24    MAMMOGRAPHY   Result Value Ref Range    BCS Recommendation External Repeat mammogram in 1 year          Assessment:     1. Laceration of right lower extremity, initial encounter        Plan:   Patient vaccination updated today.  Discuss concern for skin flap laceration and skin repair plan.  Patient agrees laceration concern only declines imaging today.  Patient understands suture wound repair continued home wound care and discharge plan with OTC, Rx antibiotic ointment and backup antibiotic coverage with continued wound check and plan for suture removal.  Patient verbalized understanding and is ready for discharge now.    Keep wound clean with soap and water then dry immediately with bandage change and antibiotic ointment application 2-3 times daily.  Recommend may start Keflex antibiotic coverage if redness or concern for infection develops in the next 1-2 days.  Recommend alternate Tylenol and ibuprofen every 6-8 hours if needed for mild-to-moderate pain and inflammation.  Plan for suture removal in approximately 10 days.  Recommend follow-up with primary care physician or urgent care in 3-5 days for wound check if needed.    Laceration of right lower extremity, initial encounter    Other orders  -     Tdap (BOOSTRIX) vaccine  injection 0.5 mL  -     Laceration Repair  -     mupirocin (BACTROBAN) 2 % ointment; Apply topically 3 (three) times daily. for 7 days  Dispense: 15 g; Refill: 0  -     Discontinue: cephALEXin (KEFLEX) 500 MG capsule; Take 1 capsule (500 mg total) by mouth every 8 (eight) hours. for 5 days  Dispense: 15 capsule; Refill: 0  -     cephALEXin (KEFLEX) 500 MG capsule; Take 1 capsule (500 mg total) by mouth every 8 (eight) hours. for 5 days  Dispense: 15 capsule; Refill: 0

## 2024-09-23 ENCOUNTER — OFFICE VISIT (OUTPATIENT)
Dept: URGENT CARE | Facility: CLINIC | Age: 69
End: 2024-09-23
Payer: MEDICARE

## 2024-09-23 VITALS
RESPIRATION RATE: 18 BRPM | DIASTOLIC BLOOD PRESSURE: 84 MMHG | OXYGEN SATURATION: 98 % | BODY MASS INDEX: 23.92 KG/M2 | HEIGHT: 63 IN | TEMPERATURE: 98 F | SYSTOLIC BLOOD PRESSURE: 169 MMHG | WEIGHT: 135 LBS | HEART RATE: 69 BPM

## 2024-09-23 DIAGNOSIS — Z48.02 ENCOUNTER FOR REMOVAL OF SUTURES: Primary | ICD-10-CM

## 2024-09-23 PROCEDURE — 99212 OFFICE O/P EST SF 10 MIN: CPT | Mod: ,,, | Performed by: NURSE PRACTITIONER

## 2024-09-23 NOTE — PROGRESS NOTES
"Subjective:      Patient ID: Cesia Lawson is a 69 y.o. female.    Vitals:  height is 5' 3" (1.6 m) and weight is 61.2 kg (135 lb). Her tympanic temperature is 98.2 °F (36.8 °C). Her blood pressure is 169/84 (abnormal) and her pulse is 69. Her respiration is 18 and oxygen saturation is 98%.     Chief Complaint: Suture / Staple Removal     Patient is a 69 y.o. female who presents to urgent care for suture removal, placed on 09/12/2024 a total of 4 sutures.  Only 3 sutures remain due to popping a stitch while at home.  V-shaped laceration to anterior portion of shin.    Suture / Staple Removal        Constitution: Negative.   HENT: Negative.     Neck: neck negative.   Cardiovascular: Negative.    Eyes: Negative.    Respiratory: Negative.     Gastrointestinal: Negative.    Endocrine: negative.   Genitourinary: Negative.    Musculoskeletal: Negative.    Skin:  Positive for laceration.        Sutured a left anterior shin with 3 sutures present.  Patient states she popped a suture at home.   Allergic/Immunologic: Negative.    Neurological: Negative.    Hematologic/Lymphatic: Negative.    Psychiatric/Behavioral: Negative.        Objective:     Physical Exam   Constitutional: She is oriented to person, place, and time. She appears well-developed. She is cooperative.   HENT:   Head: Normocephalic and atraumatic.   Ears:   Right Ear: Hearing, tympanic membrane, external ear and ear canal normal.   Left Ear: Hearing, tympanic membrane, external ear and ear canal normal.   Nose: Nose normal. No mucosal edema or nasal deformity. No epistaxis. Right sinus exhibits no maxillary sinus tenderness and no frontal sinus tenderness. Left sinus exhibits no maxillary sinus tenderness and no frontal sinus tenderness.   Mouth/Throat: Uvula is midline, oropharynx is clear and moist and mucous membranes are normal. No trismus in the jaw. Normal dentition. No uvula swelling.   Eyes: Conjunctivae and lids are normal.   Neck: Trachea normal " and phonation normal. Neck supple.   Cardiovascular: Normal rate, regular rhythm, normal heart sounds and normal pulses.   Pulmonary/Chest: Effort normal and breath sounds normal.   Abdominal: Normal appearance and bowel sounds are normal. Soft.   Musculoskeletal: Normal range of motion.         General: Normal range of motion.   Neurological: She is alert and oriented to person, place, and time. She exhibits normal muscle tone.   Skin: Skin is warm, dry and intact.         Comments: Laceration to left anterior shin   Psychiatric: Her speech is normal and behavior is normal. Judgment and thought content normal.   Nursing note and vitals reviewed.      Assessment:     1. Encounter for removal of sutures        Plan:        A laceration is an injury to the skin and the soft tissue underneath it. Lacerations can happen anywhere on the body.    DISCHARGE INSTRUCTIONS:    Seek care immediately if:    You have heavy bleeding or bleeding that does not stop after 10 minutes of holding firm, direct pressure over the wound.  Your wound opens up.    Call your doctor if:    You have a fever or chills.  Your laceration is red, warm, or swollen.  You have red streaks on your skin coming from your wound.  You have white or yellow drainage from the wound that smells bad.  You have pain that gets worse, even after treatment.  You have questions or concerns about your condition or care.    Medicines:  You may need any of the following:    Prescription pain medicine may be given. Ask your healthcare provider how to take this medicine safely. Some prescription pain medicines contain acetaminophen. Do not take other medicines that contain acetaminophen without talking to your healthcare provider. Too much acetaminophen may cause liver damage. Prescription pain medicine may cause constipation. Ask your healthcare provider how to prevent or treat constipation.    Antibiotics help treat or prevent a bacterial infection.    Take your  medicine as directed. Contact your healthcare provider if you think your medicine is not helping or if you have side effects. Tell your provider if you are allergic to any medicine. Keep a list of the medicines, vitamins, and herbs you take. Include the amounts, and when and why you take them. Bring the list or the pill bottles to follow-up visits. Carry your medicine list with you in case of an emergency.    Care for your wound as directed:  Do not get your wound wet until your healthcare provider says it is okay. Do not soak your wound in water. Do not go swimming until your healthcare provider says it is okay. Carefully wash the wound with soap and water. Gently pat the area dry or allow it to air dry.  Change your bandages when they get wet, dirty, or after washing. Apply new, clean bandages as directed. Do not apply elastic bandages or tape too tight. Do not put powders or lotions over your incision.  Apply antibiotic ointment as directed. Your healthcare provider may give you antibiotic ointment to put over your wound if you have stitches. If you have strips of tape over your incision, let them dry up and fall off on their own. If they do not fall off within 14 days, gently remove them. If you have glue over your wound, do not remove or pick at it. If your glue comes off, do not replace it with glue that you have at home.  Check your wound every day for signs of infection, such as swelling, redness, or pus.    Self-care:  Apply ice on your wound for 15 to 20 minutes every hour or as directed. Use an ice pack, or put crushed ice in a plastic bag. Cover it with a towel. Ice helps prevent tissue damage and decreases swelling and pain.  Use a splint as directed. A splint will decrease movement and stress on your wound. It may help it heal faster. A splint may be used for lacerations over joints or areas of your body that bend. Ask your healthcare provider how to apply and remove a splint.  Decrease scarring of  your wound by applying ointments as directed. Do not apply ointments until your healthcare provider says it is okay. You may need to wait until your wound is healed. Ask which ointment to buy and how often to use it. After your wound is healed, use sunscreen over the area when you are out in the sun. You should do this for at least 6 months to 1 year after your injury.         Encounter for removal of sutures

## 2024-10-08 DIAGNOSIS — E03.9 ACQUIRED HYPOTHYROIDISM: Primary | Chronic | ICD-10-CM

## 2024-10-09 RX ORDER — LEVOTHYROXINE SODIUM 50 UG/1
50 TABLET ORAL DAILY
Qty: 90 TABLET | Refills: 3 | Status: SHIPPED | OUTPATIENT
Start: 2024-10-09 | End: 2025-10-09

## 2024-12-12 ENCOUNTER — CLINICAL SUPPORT (OUTPATIENT)
Dept: PRIMARY CARE CLINIC | Facility: CLINIC | Age: 69
End: 2024-12-12
Payer: MEDICARE

## 2024-12-12 DIAGNOSIS — I10 PRIMARY HYPERTENSION: ICD-10-CM

## 2024-12-12 DIAGNOSIS — E03.9 ACQUIRED HYPOTHYROIDISM: Chronic | ICD-10-CM

## 2024-12-12 DIAGNOSIS — E78.1 HYPERTRIGLYCERIDEMIA: Chronic | ICD-10-CM

## 2024-12-12 DIAGNOSIS — R79.9 ABNORMAL BLOOD CHEMISTRY: ICD-10-CM

## 2024-12-12 LAB
ALBUMIN SERPL-MCNC: 4.1 G/DL (ref 3.4–4.8)
ALBUMIN/GLOB SERPL: 1.5 RATIO (ref 1.1–2)
ALP SERPL-CCNC: 51 UNIT/L (ref 40–150)
ALT SERPL-CCNC: 13 UNIT/L (ref 0–55)
ANION GAP SERPL CALC-SCNC: 5 MEQ/L
AST SERPL-CCNC: 16 UNIT/L (ref 5–34)
BASOPHILS # BLD AUTO: 0.06 X10(3)/MCL
BASOPHILS NFR BLD AUTO: 0.7 %
BILIRUB SERPL-MCNC: 0.4 MG/DL
BUN SERPL-MCNC: 12.1 MG/DL (ref 9.8–20.1)
CALCIUM SERPL-MCNC: 9.2 MG/DL (ref 8.4–10.2)
CHLORIDE SERPL-SCNC: 110 MMOL/L (ref 98–107)
CHOLEST SERPL-MCNC: 189 MG/DL
CHOLEST/HDLC SERPL: 5 {RATIO} (ref 0–5)
CO2 SERPL-SCNC: 26 MMOL/L (ref 23–31)
CREAT SERPL-MCNC: 0.77 MG/DL (ref 0.55–1.02)
CREAT/UREA NIT SERPL: 16
EOSINOPHIL # BLD AUTO: 0.15 X10(3)/MCL (ref 0–0.9)
EOSINOPHIL NFR BLD AUTO: 1.8 %
ERYTHROCYTE [DISTWIDTH] IN BLOOD BY AUTOMATED COUNT: 12.8 % (ref 11.5–17)
EST. AVERAGE GLUCOSE BLD GHB EST-MCNC: 108.3 MG/DL
GFR SERPLBLD CREATININE-BSD FMLA CKD-EPI: >60 ML/MIN/1.73/M2
GLOBULIN SER-MCNC: 2.7 GM/DL (ref 2.4–3.5)
GLUCOSE SERPL-MCNC: 97 MG/DL (ref 82–115)
HBA1C MFR BLD: 5.4 %
HCT VFR BLD AUTO: 39.5 % (ref 37–47)
HDLC SERPL-MCNC: 38 MG/DL (ref 35–60)
HGB BLD-MCNC: 13.3 G/DL (ref 12–16)
IMM GRANULOCYTES # BLD AUTO: 0.02 X10(3)/MCL (ref 0–0.04)
IMM GRANULOCYTES NFR BLD AUTO: 0.2 %
LDLC SERPL CALC-MCNC: 115 MG/DL (ref 50–140)
LYMPHOCYTES # BLD AUTO: 2.02 X10(3)/MCL (ref 0.6–4.6)
LYMPHOCYTES NFR BLD AUTO: 24.8 %
MCH RBC QN AUTO: 29.4 PG (ref 27–31)
MCHC RBC AUTO-ENTMCNC: 33.7 G/DL (ref 33–36)
MCV RBC AUTO: 87.4 FL (ref 80–94)
MONOCYTES # BLD AUTO: 0.53 X10(3)/MCL (ref 0.1–1.3)
MONOCYTES NFR BLD AUTO: 6.5 %
NEUTROPHILS # BLD AUTO: 5.36 X10(3)/MCL (ref 2.1–9.2)
NEUTROPHILS NFR BLD AUTO: 66 %
NRBC BLD AUTO-RTO: 0 %
PLATELET # BLD AUTO: 209 X10(3)/MCL (ref 130–400)
PMV BLD AUTO: 10.5 FL (ref 7.4–10.4)
POTASSIUM SERPL-SCNC: 4.3 MMOL/L (ref 3.5–5.1)
PROT SERPL-MCNC: 6.8 GM/DL (ref 5.8–7.6)
RBC # BLD AUTO: 4.52 X10(6)/MCL (ref 4.2–5.4)
SODIUM SERPL-SCNC: 141 MMOL/L (ref 136–145)
TRIGL SERPL-MCNC: 181 MG/DL (ref 37–140)
TSH SERPL-ACNC: 1.89 UIU/ML (ref 0.35–4.94)
VLDLC SERPL CALC-MCNC: 36 MG/DL
WBC # BLD AUTO: 8.14 X10(3)/MCL (ref 4.5–11.5)

## 2024-12-12 PROCEDURE — 36415 COLL VENOUS BLD VENIPUNCTURE: CPT

## 2024-12-12 PROCEDURE — 80061 LIPID PANEL: CPT | Performed by: GENERAL PRACTICE

## 2024-12-12 PROCEDURE — 80053 COMPREHEN METABOLIC PANEL: CPT | Performed by: GENERAL PRACTICE

## 2024-12-12 PROCEDURE — 83036 HEMOGLOBIN GLYCOSYLATED A1C: CPT | Performed by: GENERAL PRACTICE

## 2024-12-12 PROCEDURE — 85025 COMPLETE CBC W/AUTO DIFF WBC: CPT | Performed by: GENERAL PRACTICE

## 2024-12-12 PROCEDURE — 84443 ASSAY THYROID STIM HORMONE: CPT | Performed by: GENERAL PRACTICE

## 2024-12-12 NOTE — PROGRESS NOTES
Patient presented for nurse visit/Blood draw. 22g needle X 1 attempt in right antecubital.  Patient tolerated procedure well.     Yes

## 2024-12-15 NOTE — PROGRESS NOTES
"  Patient ID: [id number]     Chief Complaint: Annual Exam      HPI:     Cesia Lawson is a 69 y.o. female here today for a Medicare Wellness. No other complaints today.     Lately, she has been having some episodes of experiencing a heavy feeling in the center of her chest, associated with mild dyspnea and fatigue, only with exertion.  No prior history of cardiovascular disease.  No other symptoms, no lower extremity edema, no chest pain or dyspnea at rest.      -------------------------------------    STEFANY (generalized anxiety disorder)    History of colon cancer    History of endometrial cancer    History of skin cancer    Hyperthyroidism    Hypothyroidism    Malignant neoplasm of skin        Past Surgical History:   Procedure Laterality Date    APPENDECTOMY      BREAST BIOPSY  04/28/2015    COLON SURGERY  06/10/2015    COLONOSCOPY  05/13/2015    Dr. John Simms    COLONOSCOPY W/ POLYPECTOMY      HYSTERECTOMY      MEDIPORT INSERTION, SINGLE      MEDIPORT REMOVAL      SIGMOIDOSCOPY  02/14/2019    Dr John Simms       Review of patient's allergies indicates:  No Known Allergies    Outpatient Medications Marked as Taking for the 12/16/24 encounter (Office Visit) with Tavo Rivera MD   Medication Sig Dispense Refill    celecoxib (CELEBREX) 200 MG capsule Take 1 capsule (200 mg total) by mouth 2 (two) times daily. 60 capsule 3    levothyroxine (SYNTHROID) 50 MCG tablet Take 1 tablet (50 mcg total) by mouth once daily. 90 tablet 3    telmisartan (MICARDIS) 20 MG Tab Take 1 tablet (20 mg total) by mouth once daily. 90 tablet 3    triamcinolone acetonide 0.1% (KENALOG) 0.1 % cream Apply topically 3 (three) times daily. 15 g 5    [DISCONTINUED] ADAPT BARRIER RING 2 " Misc use as directed      [DISCONTINUED] NEW IMAGE DRAINABLE POUCH 2 3/4 " Misc use as directed      [DISCONTINUED] NEW IMAGE FLEXWEAR FLAT 2 3/4 " Misc use as directed. CHANGE every 3 DAYS         Social History     Socioeconomic History    Marital " status:    Tobacco Use    Smoking status: Former     Current packs/day: 0.00     Average packs/day: 0.5 packs/day for 53.6 years (26.8 ttl pk-yrs)     Types: Cigarettes     Start date: 1965     Quit date: 2018     Years since quittin.3     Passive exposure: Never    Smokeless tobacco: Never   Substance and Sexual Activity    Alcohol use: Not Currently    Drug use: Never        Family History   Problem Relation Name Age of Onset    Kidney cancer Mother      Lung cancer Father      Rectal cancer Sister      Rectal cancer Brother          Patient Care Team:  Tavo Rivera MD as PCP - General (Family Medicine)  [facility] -  Eddy Miller LPN as Care Coordinator  Nghia Chaudhry MD as Consulting Physician (Urology)  Rk Krishna MD (Dermatology)     Opioid Screening: Patient medication list reviewed, patient is not taking prescription opioids. Patient is not using additional opioids than prescribed. Patient is at low risk of substance abuse based on this opioid use history.       Subjective:     Review of Systems   Constitutional: Negative.  Negative for malaise/fatigue and weight loss.   HENT: Negative.     Eyes: Negative.    Respiratory: Negative.  Negative for shortness of breath.    Cardiovascular: Negative.  Negative for chest pain.   Gastrointestinal: Negative.    Genitourinary: Negative.    Musculoskeletal: Negative.    Skin: Negative.    Neurological: Negative.  Negative for focal weakness, weakness and headaches.   Endo/Heme/Allergies: Negative.    Psychiatric/Behavioral: Negative.  Negative for depression and memory loss. The patient is not nervous/anxious.          Patient Reported Health Risk Assessment  What [age] 65-69  Are you male or female?: Female  During the past four weeks, how much have you been bothered by emotional problems such as feeling anxious, depressed, irritable, sad, or downhearted and blue?: Not at all  During the past  five weeks, has your physical and/or emotional health limited your social activities with family, friends, neighbors, or groups?: Not at all  During the past four weeks, how much bodily pain have you generally had?: No pain  During the past four weeks, was someone available to help if you needed and wanted help?: Yes, as much as I wanted  During the past four weeks, what was the hardest physical activity you could do for at least two minutes?: Light  Can you get to places out of walking distance without help?  (For example, can you travel alone on buses or taxis, or drive your own car?): Yes  Can you go shopping for groceries or clothes without someone's help?: Yes  Can you prepare your own meals?: Yes  Can you do your own housework without help?: Yes  Because of any health problems, do you need the help of another person with your personal care needs such as eating, bathing, dressing, or getting around the house?: No  Can you handle your own money without help?: Yes  During the past four weeks, how would you rate your health in general?: Good  How have things been going for you during the past four weeks?: Pretty well  Are you having difficulties driving your car?: No  Do you always fasten your seat belt when you are in a car?: Yes, usually  How often in the past four weeks have you been bothered by falling or dizzy when standing up?: Never  How often in the past four weeks have you been bothered by sexual problems?: Never  How often in the past four weeks have you been bothered by trouble eating well?: Never  How often in the past four weeks have you been bothered by teeth or denture problems?: Never  How often in the past four weeks have you been bothered with problems using the telephone?: Never  How often in the past four weeks have you been bothered by tiredness or fatigue?: Never  Have you fallen two or more times in the past year?: No  Are you afraid of falling?: No  Are you a smoker?: No  During the past  "four weeks, how many drinks of wine, beer, or other alcoholic beverages did you have?: One drink or less per week  Do you exercise for about 20 minutes three or more days a week?: No, I usually do not exercise this much  Have you been given any information to help you with hazards in your house that might hurt you?: No  Have you been given any information to help you with keeping track of your medications?: No  How often do you have trouble taking medicines the way you've been told to take them?: I always take them as prescribed  How confident are you that you can control and manage most of your health problems?: Very confident  What is your race? (Check all that apply.):     Objective:     /83   Pulse 64   Resp 18   Ht 5' 3" (1.6 m)   Wt 61.2 kg (135 lb)   SpO2 97%   BMI 23.91 kg/m²     Physical Exam  Constitutional:       Appearance: Normal appearance.   HENT:      Head: Normocephalic.      Mouth/Throat:      Mouth: Mucous membranes are moist.      Pharynx: Oropharynx is clear.   Eyes:      Conjunctiva/sclera: Conjunctivae normal.      Pupils: Pupils are equal, round, and reactive to light.   Cardiovascular:      Rate and Rhythm: Normal rate and regular rhythm.      Heart sounds: Normal heart sounds.   Pulmonary:      Effort: Pulmonary effort is normal.      Breath sounds: Normal breath sounds.   Abdominal:      General: Abdomen is flat.      Palpations: Abdomen is soft.   Musculoskeletal:         General: Normal range of motion.      Cervical back: Neck supple.   Skin:     General: Skin is warm and dry.   Neurological:      General: No focal deficit present.      Mental Status: She is alert and oriented to person, place, and time.   Psychiatric:         Mood and Affect: Mood normal.         Behavior: Behavior normal.         Thought Content: Thought content normal.         Judgment: Judgment normal.         Lab Results   Component Value Date     12/12/2024    K 4.3 12/12/2024     " (H) 12/12/2024    CO2 26 12/12/2024    BUN 12.1 12/12/2024    CREATININE 0.77 12/12/2024    CALCIUM 9.2 12/12/2024    ALBUMIN 4.1 12/12/2024    BILITOT 0.4 12/12/2024    ALKPHOS 51 12/12/2024    AST 16 12/12/2024    ALT 13 12/12/2024    EGFRNORACEVR >60 12/12/2024     Lab Results   Component Value Date    WBC 8.14 12/12/2024    RBC 4.52 12/12/2024    HGB 13.3 12/12/2024    HCT 39.5 12/12/2024    MCV 87.4 12/12/2024    RDW 12.8 12/12/2024     12/12/2024      Lab Results   Component Value Date    CHOL 189 12/12/2024    TRIG 181 (H) 12/12/2024    HDL 38 12/12/2024    .00 12/12/2024    TOTALCHOLEST 5 12/12/2024     Lab Results   Component Value Date    TSH 1.885 12/12/2024     Lab Results   Component Value Date    HGBA1C 5.4 12/12/2024            No data to display                  12/16/2024    10:15 AM 12/12/2023    10:30 AM 6/6/2023    10:15 AM 12/6/2022    10:30 AM 7/31/2022     9:55 AM   Fall Risk Assessment - Outpatient   Mobility Status Ambulatory Ambulatory Ambulatory Ambulatory Ambulatory   Number of falls 1 0 0 0 0   Identified as fall risk False False False False False           Depression Screening  Over the past two weeks, has the patient felt down, depressed, or hopeless?: No  Over the past two weeks, has the patient felt little interest or pleasure in doing things?: No  Functional Ability/Safety Screening  Was the patient's timed Up & Go test unsteady or longer than 30 seconds?: No  Does the patient need help with phone, transportation, shopping, preparing meals, housework, laundry, meds, or managing money?: No  Does the patient's home have rugs in the hallway, lack grab bars in the bathroom, lack handrails on the stairs or have poor lighting?: No  Have you noticed any hearing difficulties?: No  Cognitive Function (Assessed through direct observation with due consideration of information obtained by way of patient reports and/or concerns raised by family, friends, caretakers, or others)     Does the patient repeat questions/statements in the same day?: No  Does the patient have trouble remembering the date, year, and time?: No  Does the patient have difficulty managing finances?: No  Does the patient have a decreased sense of direction?: No  Assessment:       ICD-10-CM ICD-9-CM   1. Medicare annual wellness visit, subsequent  Z00.00 V70.0   2. Primary hypertension  I10 401.9   3. Acquired hypothyroidism  E03.9 244.9   4. Hypertriglyceridemia  E78.1 272.1   5. Generalized anxiety disorder  F41.1 300.02   6. Colostomy care  Z43.3 V55.3   7. Postmenopausal  Z78.0 V49.81   8. History of smoking 25-50 pack years  Z87.891 V15.82   9. Dyspnea on exertion  R06.09 786.09        Plan:     Medicare Annual Wellness and Personalized Prevention Plan:   Fall Risk + Home Safety + Hearing Impairment + Depression Screen + Cognitive Impairment Screen + Health Risk Assessment all reviewed.       Health Maintenance Topics with due status: Not Due       Topic Last Completion Date    Mammogram 06/27/2024    TETANUS VACCINE 09/12/2024    Lipid Panel 12/12/2024      The patient's Health Maintenance was reviewed and the following appears to be due at this time:   Health Maintenance Due   Topic Date Due    DEXA Scan  Never done    LDCT Lung Screen  10/05/2016     Health risk assessment:  After review of the patient's medical record as it relates to health risk assessment over the next 5-10 years per recommendations of the USPSTF, it was determined that all pertinent recommendations have been appropriately addressed. The patient does not desire any further preventative care measures or screening tests at this time.  We will continue to reassess at each annual visit.    1. Medicare annual wellness visit, subsequent  Comments:  Overall health status was reviewed.  Good health habits reinforced.  Annual wellness exams recommended.    2. Primary hypertension  Overview:  Prescribed telmisartan 20 mg daily.  Started on  "01/29/2024.      Blood pressures appear to be adequately controlled with current treatment plan, including prescription blood pressure medication.  Medication(s) appear to be effective at current dosages, and are not causing any side effects or problems.  Continue medical management and continue home blood pressure checks.  Average blood pressures at home should be no greater than 130/80.  Patient instructed to contact the clinic if blood pressures become elevated at any point prior to next clinic visit.    Medication will be continued at the current dosage.      3. Acquired hypothyroidism  Overview:  Prescribed levothyroxine 50 mcg daily.    Most recent TSH/thyroid function appears to be normal.    Continue thyroid supplementation medication at current dosage.      4. Hypertriglyceridemia  Overview:  Dyslipidemia is being treated using lifestyle modification only.  Patient is not being prescribed lipid-lowering medication.  As discussed, we will continue to monitor this, and may ultimately choose to prescribe medication if this becomes difficult to control utilizing lifestyle modification only.      5. Generalized anxiety disorder  Overview:  Prescribed lorazepam 0.5 mg, takes infrequently.    This medical condition is currently stable on prescription medication, continue current treatment plan.      6. Colostomy care  Overview:  Patient does have a chronic colostomy from colon cancer surgery.  Notably, she no longer has a colon.       Orders:  -     ADAPT BARRIER RING 2 " Misc; use as directed  Dispense: 30 each; Refill: 11  -     NEW IMAGE DRAINABLE POUCH 2 3/4 " Misc; use as directed  Dispense: 60 each; Refill: 11  -     NEW IMAGE FLEXWEAR FLAT 2 3/4 " Misc; use as directed. CHANGE every 3 DAYS  Dispense: 60 each; Refill: 11    7. Postmenopausal  Overview:  DEXA scan will be ordered, no previous history of osteoporosis.    Orders:  -     DXA Bone Density Axial Skeleton 1 or more sites; Future; Expected date: " "12/16/2024    8. History of smoking 25-50 pack years  Overview:  Patient has about a 25 pack year smoking, quit in 2018, neurological for annual lung cancer screening through 2033.         Assessment & Plan:  LDCT screening ordered 12/26/2024.    Orders:  -     CT Chest Lung Screening Low Dose; Future; Expected date: 12/16/2024    9. Dyspnea on exertion  Overview:  Patient having some dyspnea on exertion, mild chest pressure, and fatigue with activity.  We will place a referral to see Dr. Brooks for an evaluation.  ER precautions discussed.    Orders:  -     Ambulatory referral/consult to Cardiology; Future; Expected date: 12/17/2024            Advance Care Planning     Date: 12/15/2024    Living Will  During this visit, I engaged the patient  in the voluntary advance care planning process.  The patient and I reviewed the role for advance directives and their purpose in directing future healthcare if the patient's unable to speak for him/herself.  At this point in time, the patient does have full decision-making capacity.  We discussed different extreme health states that she could experience, and reviewed what kind of medical care she would want in those situations.  The patient communicated that if she were comatose and had little chance of a meaningful recovery, she would not want machines/life-sustaining treatments used. In addition to the above preference, other important end-of-life issues for the patient include no other issues.  Advanced care planning paperwork given to patient to bring home and discuss with the family.  Once signed, patient should bring form back for for the purposes of entering it into the medical record.  I spent a total of 5 minutes engaging the patient in this advance care planning discussion.              Current Outpatient Medications   Medication Instructions    ADAPT BARRIER RING 2 " Misc use as directed    celecoxib (CELEBREX) 200 mg, Oral, 2 times daily    levothyroxine " "(SYNTHROID) 50 mcg, Oral, Daily    NEW IMAGE DRAINABLE POUCH 2 3/4 " Misc use as directed    NEW IMAGE FLEXWEAR FLAT 2 3/4 " Misc use as directed. CHANGE every 3 DAYS    telmisartan (MICARDIS) 20 mg, Oral, Daily    triamcinolone acetonide 0.1% (KENALOG) 0.1 % cream Topical (Top), 3 times daily        Follow up in about 6 months (around 6/16/2025) for Chronic condition F/up. In addition to their scheduled follow up, the patient has also been instructed to follow up on as needed basis.     This note was created with the assistance of [employer] voice recognition software or phone dictation. There may be transcription errors as a result of using this technology. Effort has been made to assure accuracy of transcription but any obvious errors or omissions should be clarified with the author of the document.  "

## 2024-12-16 ENCOUNTER — OFFICE VISIT (OUTPATIENT)
Dept: PRIMARY CARE CLINIC | Facility: CLINIC | Age: 69
End: 2024-12-16
Payer: MEDICARE

## 2024-12-16 VITALS
OXYGEN SATURATION: 97 % | WEIGHT: 135 LBS | DIASTOLIC BLOOD PRESSURE: 83 MMHG | HEART RATE: 64 BPM | BODY MASS INDEX: 23.92 KG/M2 | SYSTOLIC BLOOD PRESSURE: 133 MMHG | HEIGHT: 63 IN | RESPIRATION RATE: 18 BRPM

## 2024-12-16 DIAGNOSIS — Z87.891 HISTORY OF SMOKING 25-50 PACK YEARS: Chronic | ICD-10-CM

## 2024-12-16 DIAGNOSIS — E03.9 ACQUIRED HYPOTHYROIDISM: Chronic | ICD-10-CM

## 2024-12-16 DIAGNOSIS — R06.09 DYSPNEA ON EXERTION: ICD-10-CM

## 2024-12-16 DIAGNOSIS — Z43.3 COLOSTOMY CARE: Chronic | ICD-10-CM

## 2024-12-16 DIAGNOSIS — Z00.00 MEDICARE ANNUAL WELLNESS VISIT, SUBSEQUENT: Primary | ICD-10-CM

## 2024-12-16 DIAGNOSIS — I10 PRIMARY HYPERTENSION: Chronic | ICD-10-CM

## 2024-12-16 DIAGNOSIS — Z78.0 POSTMENOPAUSAL: ICD-10-CM

## 2024-12-16 DIAGNOSIS — F41.1 GENERALIZED ANXIETY DISORDER: Chronic | ICD-10-CM

## 2024-12-16 DIAGNOSIS — E78.1 HYPERTRIGLYCERIDEMIA: Chronic | ICD-10-CM

## 2024-12-16 PROCEDURE — G0439 PPPS, SUBSEQ VISIT: HCPCS | Mod: ,,, | Performed by: GENERAL PRACTICE

## 2024-12-16 RX ORDER — OSTOMY SUPPLY 1 3/4"
EACH MISCELLANEOUS
Qty: 60 EACH | Refills: 11 | Status: SHIPPED | OUTPATIENT
Start: 2024-12-16 | End: 2025-12-17

## 2024-12-16 RX ORDER — OSTOMY SUPPLY 4" X 4"
EACH MISCELLANEOUS
COMMUNITY
Start: 2024-12-09 | End: 2024-12-16 | Stop reason: SDUPTHER

## 2024-12-16 RX ORDER — OSTOMY SUPPLY 4" X 4"
EACH MISCELLANEOUS
Qty: 30 EACH | Refills: 11 | Status: SHIPPED | OUTPATIENT
Start: 2024-12-16 | End: 2025-12-17

## 2024-12-16 RX ORDER — OSTOMY SUPPLY 1 3/4"
EACH MISCELLANEOUS
COMMUNITY
Start: 2024-12-09 | End: 2024-12-16 | Stop reason: SDUPTHER

## 2024-12-16 RX ORDER — COLOSTOMY BAGS 1 3/4"
EACH MISCELLANEOUS
Qty: 60 EACH | Refills: 11 | Status: SHIPPED | OUTPATIENT
Start: 2024-12-16 | End: 2025-12-17

## 2024-12-16 RX ORDER — COLOSTOMY BAGS 1 3/4"
EACH MISCELLANEOUS
COMMUNITY
Start: 2024-12-10 | End: 2024-12-16 | Stop reason: SDUPTHER

## 2024-12-17 ENCOUNTER — PATIENT MESSAGE (OUTPATIENT)
Dept: PRIMARY CARE CLINIC | Facility: CLINIC | Age: 69
End: 2024-12-17
Payer: COMMERCIAL

## 2024-12-23 ENCOUNTER — TELEPHONE (OUTPATIENT)
Dept: PRIMARY CARE CLINIC | Facility: CLINIC | Age: 69
End: 2024-12-23
Payer: COMMERCIAL

## 2024-12-23 ENCOUNTER — PATIENT MESSAGE (OUTPATIENT)
Dept: PRIMARY CARE CLINIC | Facility: CLINIC | Age: 69
End: 2024-12-23
Payer: COMMERCIAL

## 2024-12-23 NOTE — TELEPHONE ENCOUNTER
----- Message from Nurse Medley sent at 12/23/2024  1:25 PM CST -----    ----- Message -----  From: Tavo Rivera MD  Sent: 12/23/2024   1:19 PM CST  To: Jasmyne Barron LPN    Please contact patient and set up a follow-up appointment is discuss her bone scan and her lung CT.  Several findings on both, osteoporosis, emphysema, atherosclerosis, all look that plus few more things need an appointment for discussion.  ----- Message -----  From: Interface, Rad Results In  Sent: 12/19/2024  12:11 PM CST  To: Tavo Rivera MD

## 2025-01-02 PROBLEM — I25.10 ATHEROSCLEROSIS OF NATIVE CORONARY ARTERY OF NATIVE HEART WITHOUT ANGINA PECTORIS: Chronic | Status: ACTIVE | Noted: 2025-01-02

## 2025-01-02 PROBLEM — J43.2 CENTRILOBULAR EMPHYSEMA: Status: ACTIVE | Noted: 2025-01-02

## 2025-01-02 PROBLEM — M81.0 AGE-RELATED OSTEOPOROSIS WITHOUT CURRENT PATHOLOGICAL FRACTURE: Chronic | Status: ACTIVE | Noted: 2025-01-02

## 2025-01-02 PROBLEM — I70.0 ABDOMINAL AORTIC ATHEROSCLEROSIS: Chronic | Status: ACTIVE | Noted: 2025-01-02

## 2025-01-02 NOTE — PROGRESS NOTES
"Subjective:       Patient ID: Cesia Lawson is a 69 y.o. female.    Chief Complaint: Results (Dexa Scan and Lung CT)    Established patient, presents to the clinic to discuss results from a recent DEXA scan and low-dose CT scan for lung cancer screening.      Review of Systems   Constitutional: Negative.  Negative for fatigue and fever.   HENT: Negative.  Negative for sore throat and trouble swallowing.    Eyes: Negative.  Negative for redness and visual disturbance.   Respiratory: Negative.  Negative for chest tightness and shortness of breath.    Cardiovascular: Negative.  Negative for chest pain.   Gastrointestinal: Negative.  Negative for abdominal pain, blood in stool and nausea.   Endocrine: Negative.  Negative for cold intolerance, heat intolerance and polyuria.   Genitourinary: Negative.    Musculoskeletal: Negative.  Negative for arthralgias, gait problem and joint swelling.   Integumentary:  Negative for rash. Negative.   Neurological: Negative.  Negative for dizziness, weakness and headaches.   Psychiatric/Behavioral: Negative.  Negative for agitation and dysphoric mood.            Patient Care Team:  Tavo Rivera MD as PCP - General (Family Medicine)  [facility]  Eddy Miller LPN as Care Coordinator  Nghia Chaudhry MD as Consulting Physician (Urology)  Rk Krishna MD (Dermatology)  Objective:   Visit Vitals  /84   Pulse 70   Resp 18   Ht 5' 3" (1.6 m)   Wt 60.8 kg (134 lb)   SpO2 99%   BMI 23.74 kg/m²        Physical Exam  Constitutional:       Appearance: Normal appearance.   Eyes:      Conjunctiva/sclera: Conjunctivae normal.   Cardiovascular:      Rate and Rhythm: Normal rate and regular rhythm.   Pulmonary:      Effort: Pulmonary effort is normal.      Breath sounds: Normal breath sounds.   Skin:     General: Skin is warm and dry.   Neurological:      Mental Status: She is alert.   Psychiatric:         Mood and Affect: Mood normal.         " "Behavior: Behavior normal.           Lab Results   Component Value Date     12/12/2024    K 4.3 12/12/2024     (H) 12/12/2024    CO2 26 12/12/2024    BUN 12.1 12/12/2024    CREATININE 0.77 12/12/2024    CALCIUM 9.2 12/12/2024    ALBUMIN 4.1 12/12/2024    BILITOT 0.4 12/12/2024    ALKPHOS 51 12/12/2024    AST 16 12/12/2024    ALT 13 12/12/2024    EGFRNORACEVR >60 12/12/2024     Lab Results   Component Value Date    WBC 8.14 12/12/2024    RBC 4.52 12/12/2024    HGB 13.3 12/12/2024    HCT 39.5 12/12/2024    MCV 87.4 12/12/2024    RDW 12.8 12/12/2024     12/12/2024      Lab Results   Component Value Date    CHOL 189 12/12/2024    TRIG 181 (H) 12/12/2024    HDL 38 12/12/2024    .00 12/12/2024    TOTALCHOLEST 5 12/12/2024     Lab Results   Component Value Date    TSH 1.885 12/12/2024     Lab Results   Component Value Date    HGBA1C 5.4 12/12/2024       Assessment:       ICD-10-CM ICD-9-CM   1. Age-related osteoporosis without current pathological fracture  M81.0 733.01   2. Centrilobular emphysema  J43.2 492.8   3. Atherosclerosis of native coronary artery of native heart without angina pectoris  I25.10 414.01   4. Abdominal aortic atherosclerosis  I70.0 440.0   5. Colostomy care  Z43.3 V55.3       Current Outpatient Medications   Medication Instructions    ADAPT BARRIER RING 2 " Misc use as directed    alendronate (FOSAMAX) 70 mg, Oral, Every 7 days    celecoxib (CELEBREX) 200 mg, Oral, 2 times daily    levothyroxine (SYNTHROID) 50 mcg, Oral, Daily    NEW IMAGE DRAINABLE POUCH 2 3/4 " Misc use as directed    NEW IMAGE FLEXWEAR FLAT 2 3/4 " Misc use as directed. CHANGE every 3 DAYS    telmisartan (MICARDIS) 20 mg, Oral, Daily    triamcinolone acetonide 0.1% (KENALOG) 0.1 % cream Topical (Top), 3 times daily     Plan:     Problem List Items Addressed This Visit          Pulmonary    Centrilobular emphysema    Overview     From LDCT December 2024:    FINDINGS:  The lungs are mildly hyperexpanded " with diffuse centrilobular emphysema.  There is mild biapical pleural/parenchymal scarring which is symmetric and scattered small chronic 1-2 mm nodules at the periphery of both upper lungs which are all most suggestive of small granulomas.  Several additional scattered 1-3 mm calcified granulomas are noted in both lungs    Patient has been diagnosed with emphysema either by pulmonary function testing or imaging/CT scanning.  While there many treatments for emphysema, the most important issue is the discontinuation of smoking, if the patient has not already done so.  Certainly, continued tobacco use will continue to damage pulmonary function in a way that will ultimately worsen chronic lung disease.  Symptoms such as dyspnea will be monitored closely, and treated appropriately with medication if it occurs.            Cardiac/Vascular    Abdominal aortic atherosclerosis (Chronic)    Overview     From CT scan, December 2024:    There is mild atherosclerotic calcification and plaque at the upper abdominal aorta which is normal in caliber.     All risk factors for progression of aortic atherosclerosis have been or will be modified to achieve maximal medical management.         Atherosclerosis of native coronary artery of native heart without angina pectoris (Chronic)    Overview     From CT of chest December 2024:    The heart and great vessels are normal in size without pericardial effusion. There is mild left coronary artery atherosclerotic calcification.     We will continue to monitor all modifiable risk factors to reduce overall risk of cardiac/cardiovascular disease.            Endocrine    Age-related osteoporosis without current pathological fracture - Primary (Chronic)    Overview     Prescribed Fosamax 70 mg weekly.    Fosamax started on 01/03/2025.      From DEXA scan December 2024:    FINDINGS:  The L1-L4 total bone mineral density is 0.985 g/sq cm with T-score -0.6 and Z-score 1.5.     The left femoral neck  bone mineral density is 0.534 g/sq cm with T-score -2.8 and Z-score -1.1.     The left proximal femur total bone mineral density is 0.639 g/sq cm with T-score -2.5 and Z-score -1.0.     The right femoral neck bone mineral density is 0.538 g/sq cm with T-score -2.8 and Z-score -1.0.     The right proximal femur total bone mineral density is 0.619 g/sq cm with T-score -2.6 and Z-score -1.2.     The FRAX 10 year fracture risk is not reported as there are T-scores at or below -2.5.    Patient was advised to:  -avoid smoking  -avoid excessive alcohol intake  -exercise every day  -it healthy balanced diet with lots of fruits, vegetables, calcium, and vitamins  -get at least 1200 mg of calcium daily  -get at least 800-1000 units of vitamin-D daily  -go outside for exposure to sun which helped her body absorbed vitamin-D into the blood stream         Current Assessment & Plan     Side effects and appropriate dosing and usage of Fosamax discussed in detail, patient is aware         Relevant Medications    alendronate (FOSAMAX) 70 MG tablet       GI    Colostomy care (Chronic)    Overview     Patient does have a chronic colostomy from colon cancer surgery.  Notably, she no longer has a colon.    We do manage her colostomy with supplies, seems to be doing fairly well without significant problem.                       No follow-ups on file.    This note was created with the assistance of [employer] voice recognition software or phone dictation. There may be transcription errors as a result of using this technology. Effort has been made to assure accuracy of transcription but any obvious errors or omissions should be clarified with the author of the document.

## 2025-01-03 ENCOUNTER — OFFICE VISIT (OUTPATIENT)
Dept: PRIMARY CARE CLINIC | Facility: CLINIC | Age: 70
End: 2025-01-03
Payer: MEDICARE

## 2025-01-03 VITALS
HEART RATE: 70 BPM | OXYGEN SATURATION: 99 % | SYSTOLIC BLOOD PRESSURE: 135 MMHG | RESPIRATION RATE: 18 BRPM | HEIGHT: 63 IN | DIASTOLIC BLOOD PRESSURE: 84 MMHG | BODY MASS INDEX: 23.74 KG/M2 | WEIGHT: 134 LBS

## 2025-01-03 DIAGNOSIS — M81.0 AGE-RELATED OSTEOPOROSIS WITHOUT CURRENT PATHOLOGICAL FRACTURE: Primary | Chronic | ICD-10-CM

## 2025-01-03 DIAGNOSIS — Z43.3 COLOSTOMY CARE: Chronic | ICD-10-CM

## 2025-01-03 DIAGNOSIS — J43.2 CENTRILOBULAR EMPHYSEMA: ICD-10-CM

## 2025-01-03 DIAGNOSIS — I70.0 ABDOMINAL AORTIC ATHEROSCLEROSIS: Chronic | ICD-10-CM

## 2025-01-03 DIAGNOSIS — I25.10 ATHEROSCLEROSIS OF NATIVE CORONARY ARTERY OF NATIVE HEART WITHOUT ANGINA PECTORIS: Chronic | ICD-10-CM

## 2025-01-03 RX ORDER — ALENDRONATE SODIUM 70 MG/1
70 TABLET ORAL
Qty: 4 TABLET | Refills: 11 | Status: SHIPPED | OUTPATIENT
Start: 2025-01-03 | End: 2026-01-03

## 2025-01-15 ENCOUNTER — PATIENT MESSAGE (OUTPATIENT)
Dept: ADMINISTRATIVE | Facility: OTHER | Age: 70
End: 2025-01-15
Payer: COMMERCIAL

## 2025-01-15 ENCOUNTER — LAB VISIT (OUTPATIENT)
Dept: LAB | Facility: HOSPITAL | Age: 70
End: 2025-01-15
Attending: NURSE PRACTITIONER
Payer: MEDICARE

## 2025-01-15 DIAGNOSIS — I10 HYPERTENSION, ESSENTIAL: Primary | ICD-10-CM

## 2025-01-15 LAB
ANION GAP SERPL CALC-SCNC: 4 MEQ/L
BUN SERPL-MCNC: 13.1 MG/DL (ref 9.8–20.1)
CALCIUM SERPL-MCNC: 9.7 MG/DL (ref 8.4–10.2)
CHLORIDE SERPL-SCNC: 112 MMOL/L (ref 98–107)
CO2 SERPL-SCNC: 26 MMOL/L (ref 23–31)
CREAT SERPL-MCNC: 0.76 MG/DL (ref 0.55–1.02)
CREAT/UREA NIT SERPL: 17
ERYTHROCYTE [DISTWIDTH] IN BLOOD BY AUTOMATED COUNT: 13.1 % (ref 11.5–17)
GFR SERPLBLD CREATININE-BSD FMLA CKD-EPI: >60 ML/MIN/1.73/M2
GLUCOSE SERPL-MCNC: 105 MG/DL (ref 82–115)
HCT VFR BLD AUTO: 41.3 % (ref 37–47)
HGB BLD-MCNC: 13.8 G/DL (ref 12–16)
MCH RBC QN AUTO: 29.7 PG (ref 27–31)
MCHC RBC AUTO-ENTMCNC: 33.4 G/DL (ref 33–36)
MCV RBC AUTO: 89 FL (ref 80–94)
NRBC BLD AUTO-RTO: 0 %
PLATELET # BLD AUTO: 224 X10(3)/MCL (ref 130–400)
PMV BLD AUTO: 8.8 FL (ref 7.4–10.4)
POTASSIUM SERPL-SCNC: 4.7 MMOL/L (ref 3.5–5.1)
RBC # BLD AUTO: 4.64 X10(6)/MCL (ref 4.2–5.4)
SODIUM SERPL-SCNC: 142 MMOL/L (ref 136–145)
WBC # BLD AUTO: 8.03 X10(3)/MCL (ref 4.5–11.5)

## 2025-01-15 PROCEDURE — 85027 COMPLETE CBC AUTOMATED: CPT

## 2025-01-15 PROCEDURE — 80048 BASIC METABOLIC PNL TOTAL CA: CPT

## 2025-01-15 PROCEDURE — 36415 COLL VENOUS BLD VENIPUNCTURE: CPT

## 2025-01-16 ENCOUNTER — HOSPITAL ENCOUNTER (OUTPATIENT)
Facility: HOSPITAL | Age: 70
Discharge: HOME | End: 2025-01-16
Attending: STUDENT IN AN ORGANIZED HEALTH CARE EDUCATION/TRAINING PROGRAM | Admitting: STUDENT IN AN ORGANIZED HEALTH CARE EDUCATION/TRAINING PROGRAM
Payer: MEDICARE

## 2025-01-16 DIAGNOSIS — I20.89 ANGINAL EQUIVALENT: ICD-10-CM

## 2025-01-16 DIAGNOSIS — I20.89 STABLE ANGINA: ICD-10-CM

## 2025-01-16 LAB
OHS QRS DURATION: 94 MS
OHS QTC CALCULATION: 468 MS

## 2025-01-16 PROCEDURE — 25500020 PHARM REV CODE 255: Performed by: STUDENT IN AN ORGANIZED HEALTH CARE EDUCATION/TRAINING PROGRAM

## 2025-01-16 PROCEDURE — 63600175 PHARM REV CODE 636 W HCPCS: Performed by: STUDENT IN AN ORGANIZED HEALTH CARE EDUCATION/TRAINING PROGRAM

## 2025-01-16 PROCEDURE — 93005 ELECTROCARDIOGRAM TRACING: CPT

## 2025-01-16 PROCEDURE — 93458 L HRT ARTERY/VENTRICLE ANGIO: CPT | Performed by: STUDENT IN AN ORGANIZED HEALTH CARE EDUCATION/TRAINING PROGRAM

## 2025-01-16 PROCEDURE — 93010 ELECTROCARDIOGRAM REPORT: CPT | Mod: ,,, | Performed by: INTERNAL MEDICINE

## 2025-01-16 PROCEDURE — C1894 INTRO/SHEATH, NON-LASER: HCPCS | Performed by: STUDENT IN AN ORGANIZED HEALTH CARE EDUCATION/TRAINING PROGRAM

## 2025-01-16 PROCEDURE — C1769 GUIDE WIRE: HCPCS | Performed by: STUDENT IN AN ORGANIZED HEALTH CARE EDUCATION/TRAINING PROGRAM

## 2025-01-16 PROCEDURE — C1887 CATHETER, GUIDING: HCPCS | Performed by: STUDENT IN AN ORGANIZED HEALTH CARE EDUCATION/TRAINING PROGRAM

## 2025-01-16 PROCEDURE — 99152 MOD SED SAME PHYS/QHP 5/>YRS: CPT | Performed by: STUDENT IN AN ORGANIZED HEALTH CARE EDUCATION/TRAINING PROGRAM

## 2025-01-16 PROCEDURE — 25000003 PHARM REV CODE 250: Performed by: STUDENT IN AN ORGANIZED HEALTH CARE EDUCATION/TRAINING PROGRAM

## 2025-01-16 RX ORDER — HEPARIN SODIUM 1000 [USP'U]/ML
INJECTION, SOLUTION INTRAVENOUS; SUBCUTANEOUS
Status: DISCONTINUED | OUTPATIENT
Start: 2025-01-16 | End: 2025-01-16 | Stop reason: HOSPADM

## 2025-01-16 RX ORDER — VERAPAMIL HYDROCHLORIDE 2.5 MG/ML
INJECTION, SOLUTION INTRAVENOUS
Status: DISCONTINUED | OUTPATIENT
Start: 2025-01-16 | End: 2025-01-16 | Stop reason: HOSPADM

## 2025-01-16 RX ORDER — ACETAMINOPHEN 325 MG/1
650 TABLET ORAL EVERY 4 HOURS PRN
Status: DISCONTINUED | OUTPATIENT
Start: 2025-01-16 | End: 2025-01-16 | Stop reason: HOSPADM

## 2025-01-16 RX ORDER — IOPAMIDOL 755 MG/ML
INJECTION, SOLUTION INTRAVASCULAR
Status: DISCONTINUED | OUTPATIENT
Start: 2025-01-16 | End: 2025-01-16 | Stop reason: HOSPADM

## 2025-01-16 RX ORDER — NITROGLYCERIN 0.4 MG/1
0.4 TABLET SUBLINGUAL EVERY 5 MIN PRN
COMMUNITY
Start: 2025-01-15

## 2025-01-16 RX ORDER — ONDANSETRON 4 MG/1
8 TABLET, ORALLY DISINTEGRATING ORAL EVERY 8 HOURS PRN
Status: DISCONTINUED | OUTPATIENT
Start: 2025-01-16 | End: 2025-01-16 | Stop reason: HOSPADM

## 2025-01-16 RX ORDER — MIDAZOLAM HYDROCHLORIDE 1 MG/ML
INJECTION INTRAMUSCULAR; INTRAVENOUS
Status: DISCONTINUED | OUTPATIENT
Start: 2025-01-16 | End: 2025-01-16 | Stop reason: HOSPADM

## 2025-01-16 RX ORDER — DIAZEPAM 5 MG/1
10 TABLET ORAL
Status: DISCONTINUED | OUTPATIENT
Start: 2025-01-16 | End: 2025-01-16 | Stop reason: HOSPADM

## 2025-01-16 RX ORDER — LIDOCAINE HYDROCHLORIDE 10 MG/ML
INJECTION, SOLUTION INFILTRATION; PERINEURAL
Status: DISCONTINUED | OUTPATIENT
Start: 2025-01-16 | End: 2025-01-16 | Stop reason: HOSPADM

## 2025-01-16 RX ORDER — ASPIRIN 81 MG/1
81 TABLET ORAL DAILY
COMMUNITY

## 2025-01-16 RX ORDER — ISOSORBIDE MONONITRATE 30 MG/1
30 TABLET, EXTENDED RELEASE ORAL DAILY
COMMUNITY
Start: 2025-01-15

## 2025-01-16 RX ORDER — SODIUM CHLORIDE 0.9 % (FLUSH) 0.9 %
10 SYRINGE (ML) INJECTION
Status: DISCONTINUED | OUTPATIENT
Start: 2025-01-16 | End: 2025-01-16 | Stop reason: HOSPADM

## 2025-01-16 RX ORDER — NITROGLYCERIN 20 MG/100ML
INJECTION INTRAVENOUS
Status: DISCONTINUED | OUTPATIENT
Start: 2025-01-16 | End: 2025-01-16 | Stop reason: HOSPADM

## 2025-01-16 RX ORDER — DIPHENHYDRAMINE HCL 25 MG
50 CAPSULE ORAL
Status: DISCONTINUED | OUTPATIENT
Start: 2025-01-16 | End: 2025-01-16 | Stop reason: HOSPADM

## 2025-01-16 RX ORDER — FENTANYL CITRATE 50 UG/ML
INJECTION, SOLUTION INTRAMUSCULAR; INTRAVENOUS
Status: DISCONTINUED | OUTPATIENT
Start: 2025-01-16 | End: 2025-01-16 | Stop reason: HOSPADM

## 2025-01-16 RX ADMIN — DIPHENHYDRAMINE HYDROCHLORIDE 50 MG: 25 CAPSULE ORAL at 11:01

## 2025-01-16 RX ADMIN — DIAZEPAM 10 MG: 5 TABLET ORAL at 11:01

## 2025-01-16 NOTE — Clinical Note
The radial band was applied to the right radial artery. 16 cc's of air were inserted into the closure device. Authored by Resident/PA/NP

## 2025-01-16 NOTE — DISCHARGE SUMMARY
[facility] - Cath Lab Services  Discharge Note  Short Stay    Procedure(s) (LRB):  Angiogram, Coronary, with Left Heart Cath (N/A)      OUTCOME: Patient tolerated treatment/procedure well without complication and is now ready for discharge.    DISPOSITION: Home or Self Care    FINAL DIAGNOSIS: non obstructive CAD    FOLLOWUP: In clinic    DISCHARGE INSTRUCTIONS:  No discharge procedures on file.     TIME SPENT ON DISCHARGE: 30 minutes

## 2025-01-16 NOTE — BRIEF OP NOTE
[facility] - Cath Lab Services  Brief Operative Note    SUMMARY     Surgery Date: 1/16/2025     Surgeons and Role:     * Jose Angel Phan Jr., MD - Primary    Assisting Surgeon: None    Pre-op Diagnosis:  Stable angina [I20.89]    Post-op Diagnosis:  Post-Op Diagnosis Codes:     * Stable angina [I20.89]    Procedure(s) (LRB):  Angiogram, Coronary, with Left Heart Cath (N/A)    Anesthesia: RN IV Sedation    Implants:  * No implants in log *    Operative Findings: non obstructive CAD    Estimated Blood Loss: * No values recorded between 1/16/2025 12:10 PM and 1/16/2025 12:22 PM *    Estimated Blood Loss has been documented.         Specimens:   Specimen (24h ago, onward)      None            [id number]

## 2025-01-22 DIAGNOSIS — M54.12 CERVICAL RADICULOPATHY: ICD-10-CM

## 2025-01-23 RX ORDER — CELECOXIB 200 MG/1
200 CAPSULE ORAL 2 TIMES DAILY
Qty: 60 CAPSULE | Refills: 3 | Status: SHIPPED | OUTPATIENT
Start: 2025-01-23 | End: 2026-01-23

## 2025-01-30 DIAGNOSIS — I10 PRIMARY HYPERTENSION: ICD-10-CM

## 2025-01-31 VITALS
DIASTOLIC BLOOD PRESSURE: 69 MMHG | RESPIRATION RATE: 15 BRPM | HEIGHT: 63 IN | OXYGEN SATURATION: 95 % | WEIGHT: 134.69 LBS | HEART RATE: 64 BPM | BODY MASS INDEX: 23.86 KG/M2 | TEMPERATURE: 98 F | SYSTOLIC BLOOD PRESSURE: 116 MMHG

## 2025-01-31 RX ORDER — TELMISARTAN 20 MG/1
20 TABLET ORAL DAILY
Qty: 90 TABLET | Refills: 3 | Status: SHIPPED | OUTPATIENT
Start: 2025-01-31 | End: 2026-01-31

## 2025-06-15 PROBLEM — R73.9 BORDERLINE HYPERGLYCEMIA: Chronic | Status: ACTIVE | Noted: 2025-06-15

## 2025-06-15 NOTE — PROGRESS NOTES
"Subjective:       Patient ID: Cesia Lawson is a 69 y.o. female.    Chief Complaint:  Chronic Condition Follow-up    Patient presents to the clinic today for chronic condition follow-up.  Doing well, no specific complaints, tolerating all medications, reporting no significant side effects or problems.     Last wellness exam was 12/16/2024.     Chronic conditions being treated include but are not limited to primary HTN, hypertriglyceridemia, acquired hypothyroidism, generalized anxiety disorder.    Patient does have palpitations on occasion, has had a thorough cardiac evaluation, found to have nonobstructive coronary artery disease, sees Cardiology.        Review of Systems   Constitutional: Negative.  Negative for fatigue and fever.   HENT: Negative.  Negative for sore throat and trouble swallowing.    Eyes: Negative.  Negative for redness and visual disturbance.   Respiratory: Negative.  Negative for chest tightness and shortness of breath.    Cardiovascular: Negative.  Negative for chest pain.   Gastrointestinal: Negative.  Negative for abdominal pain, blood in stool and nausea.   Endocrine: Negative.  Negative for cold intolerance, heat intolerance and polyuria.   Genitourinary: Negative.    Musculoskeletal: Negative.  Negative for arthralgias, gait problem and joint swelling.   Integumentary:  Negative for rash. Negative.   Neurological: Negative.  Negative for dizziness, weakness and headaches.   Psychiatric/Behavioral: Negative.  Negative for agitation and dysphoric mood.            Patient Care Team:  Tavo Rivera MD as PCP - General (Family Medicine)  [facility] -  Eddy Miller LPN as Care Coordinator  Nghia Chaudhry MD as Consulting Physician (Urology)  Rk Krishna MD (Dermatology)  Objective:   Visit Vitals  /68   Pulse 72   Resp 18   Ht 5' 3" (1.6 m)   Wt 61.2 kg (135 lb)   SpO2 99%   BMI 23.91 kg/m²        Physical Exam  Constitutional:       " Appearance: Normal appearance.   HENT:      Head: Normocephalic.      Mouth/Throat:      Mouth: Mucous membranes are moist.      Pharynx: Oropharynx is clear.   Eyes:      Conjunctiva/sclera: Conjunctivae normal.      Pupils: Pupils are equal, round, and reactive to light.   Cardiovascular:      Rate and Rhythm: Normal rate and regular rhythm.      Heart sounds: Normal heart sounds.   Pulmonary:      Effort: Pulmonary effort is normal.      Breath sounds: Normal breath sounds.   Abdominal:      General: Abdomen is flat.      Palpations: Abdomen is soft.   Musculoskeletal:         General: Normal range of motion.      Cervical back: Neck supple.   Skin:     General: Skin is warm and dry.   Neurological:      General: No focal deficit present.      Mental Status: She is alert and oriented to person, place, and time.   Psychiatric:         Mood and Affect: Mood normal.         Behavior: Behavior normal.         Thought Content: Thought content normal.         Judgment: Judgment normal.           Lab Results   Component Value Date     01/15/2025     01/15/2025    K 4.7 01/15/2025     (H) 01/15/2025    CO2 26 01/15/2025    BUN 13.1 01/15/2025    CREATININE 0.76 01/15/2025    CALCIUM 9.7 01/15/2025    PROT 6.8 12/12/2024    ALBUMIN 4.1 12/12/2024    BILITOT 0.4 12/12/2024    ALKPHOS 51 12/12/2024    AST 16 12/12/2024    ALT 13 12/12/2024    EGFRNORACEVR >60 01/15/2025     Lab Results   Component Value Date    WBC 8.03 01/15/2025    RBC 4.64 01/15/2025    HGB 13.8 01/15/2025    HCT 41.3 01/15/2025    MCV 89.0 01/15/2025    RDW 13.1 01/15/2025     01/15/2025      Lab Results   Component Value Date    CHOL 189 12/12/2024    TRIG 181 (H) 12/12/2024    HDL 38 12/12/2024    .00 12/12/2024    TOTALCHOLEST 5 12/12/2024     Lab Results   Component Value Date    TSH 1.885 12/12/2024     Lab Results   Component Value Date    HGBA1C 5.4 12/12/2024      Assessment:       ICD-10-CM ICD-9-CM   1. Primary  "hypertension  I10 401.9   2. Borderline hyperglycemia  R73.9 790.29   3. Hypertriglyceridemia  E78.1 272.1   4. Acquired hypothyroidism  E03.9 244.9   5. Generalized anxiety disorder  F41.1 300.02   6. Atherosclerosis of native coronary artery of native heart without angina pectoris  I25.10 414.01   7. Abdominal aortic atherosclerosis  I70.0 440.0   8. Centrilobular emphysema  J43.2 492.8   9. Age-related osteoporosis without current pathological fracture  M81.0 733.01   10. History of smoking 25-50 pack years  Z87.891 V15.82   11. Cervical radiculopathy  M54.12 723.4   12. Ileostomy care  Z43.2 V55.2       Current Outpatient Medications   Medication Instructions    ADAPT BARRIER RING 2 " Misc SMARTSIG:As Directed    alendronate (FOSAMAX) 70 mg, Oral, Every 7 days    aspirin (ECOTRIN) 81 mg, Daily    atorvastatin (LIPITOR) 40 mg, Daily    celecoxib (CELEBREX) 200 mg, Oral, 2 times daily    clonazePAM (KLONOPIN) 0.5 mg, Oral, 2 times daily PRN    isosorbide mononitrate (IMDUR) 30 mg, Daily    levothyroxine (SYNTHROID) 50 mcg, Oral, Daily    NEW IMAGE DRAINABLE POUCH 2 3/4 " Misc use as directed    NEW IMAGE FLEXWEAR FLAT 2 3/4 " Misc use as directed. CHANGE every 3 DAYS    nitroGLYCERIN (NITROSTAT) 0.4 mg, Every 5 min PRN    telmisartan (MICARDIS) 40 mg, Daily    triamcinolone acetonide 0.1% (KENALOG) 0.1 % cream Topical (Top), 3 times daily     Plan:     Problem List Items Addressed This Visit          Neuro    Cervical radiculopathy (Chronic)    Relevant Medications    celecoxib (CELEBREX) 200 MG capsule       Psychiatric    Generalized anxiety disorder (Chronic)    Overview   Prescribed clonazepam 0.5 mg, 30 tablets, five refills.    Prescribed clonazepam initially on 06/16/2025.  Was on lorazepam, not feeling like it was working.    This medical condition is currently stable on prescription medication, continue current treatment plan.         Relevant Medications    clonazePAM (KLONOPIN) 0.5 MG tablet       " Pulmonary    Centrilobular emphysema    Overview   From LDCT December 2024:    FINDINGS:  The lungs are mildly hyperexpanded with diffuse centrilobular emphysema.  There is mild biapical pleural/parenchymal scarring which is symmetric and scattered small chronic 1-2 mm nodules at the periphery of both upper lungs which are all most suggestive of small granulomas.  Several additional scattered 1-3 mm calcified granulomas are noted in both lungs    Patient has been diagnosed with emphysema either by pulmonary function testing or imaging/CT scanning.  While there many treatments for emphysema, the most important issue is the discontinuation of smoking, if the patient has not already done so.  Certainly, continued tobacco use will continue to damage pulmonary function in a way that will ultimately worsen chronic lung disease.  Symptoms such as dyspnea will be monitored closely, and treated appropriately with medication if it occurs.            Cardiac/Vascular    Hypertriglyceridemia (Chronic)    Overview   Dyslipidemia is being treated using lifestyle modification only.  Patient is not being prescribed lipid-lowering medication.  As discussed, we will continue to monitor this, and may ultimately choose to prescribe medication if this becomes difficult to control utilizing lifestyle modification only.         Relevant Orders    Lipid Panel    Primary hypertension - Primary (Chronic)    Overview   Prescribed telmisartan 20 mg daily.  Started on 01/29/2024.      Blood pressures appear to be adequately controlled with current treatment plan, including prescription blood pressure medication.  Medication(s) appear to be effective at current dosages, and are not causing any side effects or problems.  Continue medical management and continue home blood pressure checks.  Average blood pressures at home should be no greater than 130/80.  Patient instructed to contact the clinic if blood pressures become elevated at any point  prior to next clinic visit.    Medication will be continued at the current dosage.         Relevant Orders    Comprehensive Metabolic Panel    CBC Auto Differential    Abdominal aortic atherosclerosis (Chronic)    Overview   From CT scan, December 2024:    There is mild atherosclerotic calcification and plaque at the upper abdominal aorta which is normal in caliber.     All risk factors for progression of aortic atherosclerosis have been or will be modified to achieve maximal medical management.         Atherosclerosis of native coronary artery of native heart without angina pectoris (Chronic)    Overview   From CT of chest December 2024:    The heart and great vessels are normal in size without pericardial effusion. There is mild left coronary artery atherosclerotic calcification.     We will continue to monitor all modifiable risk factors to reduce overall risk of cardiac/cardiovascular disease.            Endocrine    Acquired hypothyroidism (Chronic)    Overview   Prescribed levothyroxine 50 mcg daily.    Most recent TSH/thyroid function appears to be normal.    Continue thyroid supplementation medication at current dosage.         Relevant Orders    TSH    T4, Free    T3, Free ([facility])    Age-related osteoporosis without current pathological fracture (Chronic)    Overview   Prescribed Fosamax 70 mg weekly.    Fosamax started on 01/03/2025.      From DEXA scan December 2024:    FINDINGS:  The L1-L4 total bone mineral density is 0.985 g/sq cm with T-score -0.6 and Z-score 1.5.     The left femoral neck bone mineral density is 0.534 g/sq cm with T-score -2.8 and Z-score -1.1.     The left proximal femur total bone mineral density is 0.639 g/sq cm with T-score -2.5 and Z-score -1.0.     The right femoral neck bone mineral density is 0.538 g/sq cm with T-score -2.8 and Z-score -1.0.     The right proximal femur total bone mineral density is 0.619 g/sq cm with T-score -2.6 and Z-score -1.2.     The FRAX 10 year fracture  risk is not reported as there are T-scores at or below -2.5.    Patient was advised to:  -avoid smoking  -avoid excessive alcohol intake  -exercise every day  -it healthy balanced diet with lots of fruits, vegetables, calcium, and vitamins  -get at least 1200 mg of calcium daily  -get at least 800-1000 units of vitamin-D daily  -go outside for exposure to sun which helped her body absorbed vitamin-D into the blood stream         Borderline hyperglycemia (Chronic)    Overview   Patient has had elevated blood sugars in the past without overt prediabetes or diabetes, with previous A1c levels at 5.5-5.6%.  Since patient is at risk for developing prediabetes or diabetes, we will check A1c levels annually.         Current Assessment & Plan             Component  Ref Range & Units (hover) 6 mo ago 1 yr ago 2 yr ago 3 yr ago 4 yr ago 5 yr ago 6 yr ago   Hemoglobin A1c 5.4 5.0 5.2 5.1 R 5.3 R 5.5 R 5.4 R   Estimated Average Glucose 108.3 96.8 102.5 99.7 105.4 111 108               Relevant Orders    Hemoglobin A1C       Other    History of smoking 25-50 pack years (Chronic)    Overview   Patient has about a 25 pack year smoking, quit in 2018, recommended for annual lung cancer screening through 2033.          Other Visit Diagnoses         Ileostomy care        Relevant Medications    triamcinolone acetonide 0.1% (KENALOG) 0.1 % cream                    Follow up in about 6 months (around 12/17/2025) for Medicare Wellness.    This note was created with the assistance of [employer] voice recognition software or phone dictation. There may be transcription errors as a result of using this technology. Effort has been made to assure accuracy of transcription but any obvious errors or omissions should be clarified with the author of the document.

## 2025-06-15 NOTE — ASSESSMENT & PLAN NOTE
Component  Ref Range & Units (hover) 6 mo ago 1 yr ago 2 yr ago 3 yr ago 4 yr ago 5 yr ago 6 yr ago   Hemoglobin A1c 5.4 5.0 5.2 5.1 R 5.3 R 5.5 R 5.4 R   Estimated Average Glucose 108.3 96.8 102.5 99.7 105.4 111 108

## 2025-06-16 ENCOUNTER — OFFICE VISIT (OUTPATIENT)
Dept: PRIMARY CARE CLINIC | Facility: CLINIC | Age: 70
End: 2025-06-16
Payer: MEDICARE

## 2025-06-16 VITALS
HEART RATE: 72 BPM | SYSTOLIC BLOOD PRESSURE: 119 MMHG | WEIGHT: 135 LBS | OXYGEN SATURATION: 99 % | DIASTOLIC BLOOD PRESSURE: 68 MMHG | RESPIRATION RATE: 18 BRPM | HEIGHT: 63 IN | BODY MASS INDEX: 23.92 KG/M2

## 2025-06-16 DIAGNOSIS — J43.2 CENTRILOBULAR EMPHYSEMA: ICD-10-CM

## 2025-06-16 DIAGNOSIS — E78.1 HYPERTRIGLYCERIDEMIA: Chronic | ICD-10-CM

## 2025-06-16 DIAGNOSIS — M81.0 AGE-RELATED OSTEOPOROSIS WITHOUT CURRENT PATHOLOGICAL FRACTURE: Chronic | ICD-10-CM

## 2025-06-16 DIAGNOSIS — E03.9 ACQUIRED HYPOTHYROIDISM: Chronic | ICD-10-CM

## 2025-06-16 DIAGNOSIS — I25.10 ATHEROSCLEROSIS OF NATIVE CORONARY ARTERY OF NATIVE HEART WITHOUT ANGINA PECTORIS: Chronic | ICD-10-CM

## 2025-06-16 DIAGNOSIS — Z43.2 ILEOSTOMY CARE: ICD-10-CM

## 2025-06-16 DIAGNOSIS — R73.9 BORDERLINE HYPERGLYCEMIA: Chronic | ICD-10-CM

## 2025-06-16 DIAGNOSIS — M54.12 CERVICAL RADICULOPATHY: ICD-10-CM

## 2025-06-16 DIAGNOSIS — F41.1 GENERALIZED ANXIETY DISORDER: Chronic | ICD-10-CM

## 2025-06-16 DIAGNOSIS — I10 PRIMARY HYPERTENSION: Primary | Chronic | ICD-10-CM

## 2025-06-16 DIAGNOSIS — I70.0 ABDOMINAL AORTIC ATHEROSCLEROSIS: Chronic | ICD-10-CM

## 2025-06-16 DIAGNOSIS — Z87.891 HISTORY OF SMOKING 25-50 PACK YEARS: Chronic | ICD-10-CM

## 2025-06-16 RX ORDER — OSTOMY SUPPLY 4" X 4"
EACH MISCELLANEOUS
COMMUNITY
Start: 2025-06-14

## 2025-06-16 RX ORDER — CELECOXIB 200 MG/1
200 CAPSULE ORAL 2 TIMES DAILY
Qty: 60 CAPSULE | Refills: 3 | Status: SHIPPED | OUTPATIENT
Start: 2025-06-16 | End: 2026-06-16

## 2025-06-16 RX ORDER — ATORVASTATIN CALCIUM 40 MG/1
40 TABLET, FILM COATED ORAL DAILY
COMMUNITY
Start: 2025-06-14

## 2025-06-16 RX ORDER — TRIAMCINOLONE ACETONIDE 1 MG/G
CREAM TOPICAL 3 TIMES DAILY
Qty: 15 G | Refills: 5 | Status: SHIPPED | OUTPATIENT
Start: 2025-06-16

## 2025-06-16 RX ORDER — CLONAZEPAM 0.5 MG/1
0.5 TABLET ORAL 2 TIMES DAILY PRN
Qty: 30 TABLET | Refills: 5 | Status: SHIPPED | OUTPATIENT
Start: 2025-06-16 | End: 2025-12-13

## 2025-06-16 RX ORDER — TELMISARTAN 40 MG/1
40 TABLET ORAL DAILY
COMMUNITY
Start: 2025-05-06

## (undated) DEVICE — KIT GLIDESHEATH SLEND 6FR 10CM

## (undated) DEVICE — SET ANGIO ACIST CVI ANGIOTOUCH

## (undated) DEVICE — CANNULA NASAL ADULT

## (undated) DEVICE — DRAPE RADIAL BRACHIAL 29X42IN

## (undated) DEVICE — WIRE GUIDE INQWIRE STR 180CM

## (undated) DEVICE — Device

## (undated) DEVICE — BAND TR COMP DEVICE REG 24CM

## (undated) DEVICE — CATH OPTITORQUE TIG 4.0 100 CM

## (undated) DEVICE — PAD DEFIB CADENCE ADULT R2

## (undated) DEVICE — CATH OPTITORQUE RADIAL 5FR

## (undated) DEVICE — GUIDEWIRE INQWIRE SE 3MM JTIP